# Patient Record
Sex: MALE | Race: WHITE | NOT HISPANIC OR LATINO | Employment: FULL TIME | URBAN - METROPOLITAN AREA
[De-identification: names, ages, dates, MRNs, and addresses within clinical notes are randomized per-mention and may not be internally consistent; named-entity substitution may affect disease eponyms.]

---

## 2019-11-15 ENCOUNTER — TRANSCRIBE ORDERS (OUTPATIENT)
Dept: ADMINISTRATIVE | Facility: HOSPITAL | Age: 39
End: 2019-11-15

## 2019-11-15 DIAGNOSIS — N63.10 UNSPECIFIED LUMP IN THE RIGHT BREAST, UNSPECIFIED QUADRANT: Primary | ICD-10-CM

## 2022-08-09 ENCOUNTER — HOSPITAL ENCOUNTER (EMERGENCY)
Facility: HOSPITAL | Age: 42
Discharge: PSYCHIATRIC OR SUBSTANCE USE FACILITY | End: 2022-08-09
Attending: EMERGENCY MEDICINE | Admitting: EMERGENCY MEDICINE

## 2022-08-09 ENCOUNTER — HOSPITAL ENCOUNTER (INPATIENT)
Facility: HOSPITAL | Age: 42
LOS: 6 days | Discharge: HOME | End: 2022-08-15
Attending: PSYCHIATRY & NEUROLOGY | Admitting: PSYCHIATRY & NEUROLOGY

## 2022-08-09 VITALS
OXYGEN SATURATION: 98 % | BODY MASS INDEX: 22.22 KG/M2 | WEIGHT: 150 LBS | TEMPERATURE: 97.8 F | HEIGHT: 69 IN | SYSTOLIC BLOOD PRESSURE: 132 MMHG | DIASTOLIC BLOOD PRESSURE: 102 MMHG | RESPIRATION RATE: 17 BRPM | HEART RATE: 114 BPM

## 2022-08-09 DIAGNOSIS — F10.10 ETOH ABUSE: Primary | ICD-10-CM

## 2022-08-09 DIAGNOSIS — F10.929 ALCOHOLIC INTOXICATION WITH COMPLICATION: ICD-10-CM

## 2022-08-09 PROBLEM — Z92.89 S/P ALCOHOL DETOXIFICATION: Status: ACTIVE | Noted: 2022-08-09

## 2022-08-09 LAB
ALBUMIN SERPL-MCNC: 4.65 G/DL (ref 3.5–5.2)
ALBUMIN/GLOB SERPL: 1.5 G/DL
ALP SERPL-CCNC: 181 U/L (ref 39–117)
ALT SERPL W P-5'-P-CCNC: 52 U/L (ref 1–41)
AMPHET+METHAMPHET UR QL: NEGATIVE
AMPHETAMINES UR QL: NEGATIVE
ANION GAP SERPL CALCULATED.3IONS-SCNC: 20.9 MMOL/L (ref 5–15)
AST SERPL-CCNC: 46 U/L (ref 1–40)
BACTERIA UR QL AUTO: ABNORMAL /HPF
BARBITURATES UR QL SCN: NEGATIVE
BASOPHILS # BLD AUTO: 0.05 10*3/MM3 (ref 0–0.2)
BASOPHILS NFR BLD AUTO: 0.9 % (ref 0–1.5)
BENZODIAZ UR QL SCN: NEGATIVE
BILIRUB SERPL-MCNC: 0.3 MG/DL (ref 0–1.2)
BILIRUB UR QL STRIP: ABNORMAL
BUN SERPL-MCNC: 7 MG/DL (ref 6–20)
BUN/CREAT SERPL: 7.4 (ref 7–25)
BUPRENORPHINE SERPL-MCNC: NEGATIVE NG/ML
CALCIUM SPEC-SCNC: 9.4 MG/DL (ref 8.6–10.5)
CANNABINOIDS SERPL QL: POSITIVE
CHLORIDE SERPL-SCNC: 96 MMOL/L (ref 98–107)
CLARITY UR: ABNORMAL
CO2 SERPL-SCNC: 22.1 MMOL/L (ref 22–29)
COCAINE UR QL: NEGATIVE
COLOR UR: ABNORMAL
CREAT SERPL-MCNC: 0.95 MG/DL (ref 0.76–1.27)
DEPRECATED RDW RBC AUTO: 44.6 FL (ref 37–54)
EGFRCR SERPLBLD CKD-EPI 2021: 103.8 ML/MIN/1.73
EOSINOPHIL # BLD AUTO: 0.01 10*3/MM3 (ref 0–0.4)
EOSINOPHIL NFR BLD AUTO: 0.2 % (ref 0.3–6.2)
ERYTHROCYTE [DISTWIDTH] IN BLOOD BY AUTOMATED COUNT: 12.9 % (ref 12.3–15.4)
ETHANOL BLD-MCNC: 93 MG/DL (ref 0–10)
ETHANOL UR QL: 0.09 %
FLUAV RNA RESP QL NAA+PROBE: NOT DETECTED
FLUBV RNA RESP QL NAA+PROBE: NOT DETECTED
GLOBULIN UR ELPH-MCNC: 3.2 GM/DL
GLUCOSE SERPL-MCNC: 145 MG/DL (ref 65–99)
GLUCOSE UR STRIP-MCNC: NEGATIVE MG/DL
HCT VFR BLD AUTO: 48.2 % (ref 37.5–51)
HGB BLD-MCNC: 16.9 G/DL (ref 13–17.7)
HGB UR QL STRIP.AUTO: NEGATIVE
HYALINE CASTS UR QL AUTO: ABNORMAL /LPF
IMM GRANULOCYTES # BLD AUTO: 0.01 10*3/MM3 (ref 0–0.05)
IMM GRANULOCYTES NFR BLD AUTO: 0.2 % (ref 0–0.5)
KETONES UR QL STRIP: NEGATIVE
LEUKOCYTE ESTERASE UR QL STRIP.AUTO: NEGATIVE
LYMPHOCYTES # BLD AUTO: 2.42 10*3/MM3 (ref 0.7–3.1)
LYMPHOCYTES NFR BLD AUTO: 45.8 % (ref 19.6–45.3)
MAGNESIUM SERPL-MCNC: 1.7 MG/DL (ref 1.6–2.6)
MCH RBC QN AUTO: 33.2 PG (ref 26.6–33)
MCHC RBC AUTO-ENTMCNC: 35.1 G/DL (ref 31.5–35.7)
MCV RBC AUTO: 94.7 FL (ref 79–97)
METHADONE UR QL SCN: NEGATIVE
MONOCYTES # BLD AUTO: 0.65 10*3/MM3 (ref 0.1–0.9)
MONOCYTES NFR BLD AUTO: 12.3 % (ref 5–12)
NEUTROPHILS NFR BLD AUTO: 2.14 10*3/MM3 (ref 1.7–7)
NEUTROPHILS NFR BLD AUTO: 40.6 % (ref 42.7–76)
NITRITE UR QL STRIP: NEGATIVE
NRBC BLD AUTO-RTO: 0 /100 WBC (ref 0–0.2)
OPIATES UR QL: NEGATIVE
OXYCODONE UR QL SCN: NEGATIVE
PCP UR QL SCN: NEGATIVE
PH UR STRIP.AUTO: 5.5 [PH] (ref 5–8)
PLATELET # BLD AUTO: 298 10*3/MM3 (ref 140–450)
PMV BLD AUTO: 9.2 FL (ref 6–12)
POTASSIUM SERPL-SCNC: 3.1 MMOL/L (ref 3.5–5.2)
PROPOXYPH UR QL: NEGATIVE
PROT SERPL-MCNC: 7.8 G/DL (ref 6–8.5)
PROT UR QL STRIP: ABNORMAL
RBC # BLD AUTO: 5.09 10*6/MM3 (ref 4.14–5.8)
RBC # UR STRIP: ABNORMAL /HPF
REF LAB TEST METHOD: ABNORMAL
SARS-COV-2 RNA RESP QL NAA+PROBE: NOT DETECTED
SODIUM SERPL-SCNC: 139 MMOL/L (ref 136–145)
SP GR UR STRIP: 1.03 (ref 1–1.03)
SQUAMOUS #/AREA URNS HPF: ABNORMAL /HPF
TRICYCLICS UR QL SCN: NEGATIVE
UROBILINOGEN UR QL STRIP: ABNORMAL
WBC # UR STRIP: ABNORMAL /HPF
WBC NRBC COR # BLD: 5.28 10*3/MM3 (ref 3.4–10.8)

## 2022-08-09 PROCEDURE — 85025 COMPLETE CBC W/AUTO DIFF WBC: CPT | Performed by: EMERGENCY MEDICINE

## 2022-08-09 PROCEDURE — 82077 ASSAY SPEC XCP UR&BREATH IA: CPT | Performed by: EMERGENCY MEDICINE

## 2022-08-09 PROCEDURE — 36415 COLL VENOUS BLD VENIPUNCTURE: CPT

## 2022-08-09 PROCEDURE — 83735 ASSAY OF MAGNESIUM: CPT | Performed by: EMERGENCY MEDICINE

## 2022-08-09 PROCEDURE — 93005 ELECTROCARDIOGRAM TRACING: CPT | Performed by: PSYCHIATRY & NEUROLOGY

## 2022-08-09 PROCEDURE — 99285 EMERGENCY DEPT VISIT HI MDM: CPT

## 2022-08-09 PROCEDURE — C9803 HOPD COVID-19 SPEC COLLECT: HCPCS | Performed by: EMERGENCY MEDICINE

## 2022-08-09 PROCEDURE — 81001 URINALYSIS AUTO W/SCOPE: CPT | Performed by: EMERGENCY MEDICINE

## 2022-08-09 PROCEDURE — 80053 COMPREHEN METABOLIC PANEL: CPT | Performed by: EMERGENCY MEDICINE

## 2022-08-09 PROCEDURE — 80306 DRUG TEST PRSMV INSTRMNT: CPT | Performed by: EMERGENCY MEDICINE

## 2022-08-09 PROCEDURE — 87636 SARSCOV2 & INF A&B AMP PRB: CPT | Performed by: EMERGENCY MEDICINE

## 2022-08-09 RX ORDER — LANOLIN ALCOHOL/MO/W.PET/CERES
1000 CREAM (GRAM) TOPICAL DAILY
Status: CANCELLED | OUTPATIENT
Start: 2022-08-10

## 2022-08-09 RX ORDER — ALUMINA, MAGNESIA, AND SIMETHICONE 2400; 2400; 240 MG/30ML; MG/30ML; MG/30ML
15 SUSPENSION ORAL EVERY 6 HOURS PRN
Status: DISCONTINUED | OUTPATIENT
Start: 2022-08-09 | End: 2022-08-15 | Stop reason: HOSPADM

## 2022-08-09 RX ORDER — LOPERAMIDE HYDROCHLORIDE 2 MG/1
2 CAPSULE ORAL
Status: DISCONTINUED | OUTPATIENT
Start: 2022-08-09 | End: 2022-08-15 | Stop reason: HOSPADM

## 2022-08-09 RX ORDER — LANOLIN ALCOHOL/MO/W.PET/CERES
1000 CREAM (GRAM) TOPICAL DAILY
COMMUNITY
End: 2022-08-15 | Stop reason: HOSPADM

## 2022-08-09 RX ORDER — OMEPRAZOLE 20 MG/1
20 CAPSULE, DELAYED RELEASE ORAL DAILY
Status: ON HOLD | COMMUNITY
End: 2022-08-15 | Stop reason: SDUPTHER

## 2022-08-09 RX ORDER — ACETAMINOPHEN 325 MG/1
650 TABLET ORAL EVERY 6 HOURS PRN
Status: DISCONTINUED | OUTPATIENT
Start: 2022-08-09 | End: 2022-08-10

## 2022-08-09 RX ORDER — LORAZEPAM 2 MG/1
2 TABLET ORAL ONCE
Status: COMPLETED | OUTPATIENT
Start: 2022-08-09 | End: 2022-08-09

## 2022-08-09 RX ORDER — ONDANSETRON 4 MG/1
4 TABLET, FILM COATED ORAL EVERY 6 HOURS PRN
Status: DISCONTINUED | OUTPATIENT
Start: 2022-08-09 | End: 2022-08-15 | Stop reason: HOSPADM

## 2022-08-09 RX ORDER — LORAZEPAM 0.5 MG/1
0.5 TABLET ORAL
Status: COMPLETED | OUTPATIENT
Start: 2022-08-13 | End: 2022-08-13

## 2022-08-09 RX ORDER — DIPHENHYDRAMINE HCL 25 MG
25 CAPSULE ORAL NIGHTLY
Status: CANCELLED | OUTPATIENT
Start: 2022-08-09

## 2022-08-09 RX ORDER — CHOLECALCIFEROL (VITAMIN D3) 125 MCG
5 CAPSULE ORAL NIGHTLY
Status: ON HOLD | COMMUNITY
End: 2022-08-15 | Stop reason: SDUPTHER

## 2022-08-09 RX ORDER — LORAZEPAM 1 MG/1
1 TABLET ORAL
Status: COMPLETED | OUTPATIENT
Start: 2022-08-12 | End: 2022-08-12

## 2022-08-09 RX ORDER — POTASSIUM CHLORIDE 1.5 G/1.77G
40 POWDER, FOR SOLUTION ORAL ONCE
Status: COMPLETED | OUTPATIENT
Start: 2022-08-09 | End: 2022-08-09

## 2022-08-09 RX ORDER — MULTIPLE VITAMINS W/ MINERALS TAB 9MG-400MCG
1 TAB ORAL DAILY
Status: DISCONTINUED | OUTPATIENT
Start: 2022-08-10 | End: 2022-08-15 | Stop reason: HOSPADM

## 2022-08-09 RX ORDER — FAMOTIDINE 20 MG/1
20 TABLET, FILM COATED ORAL 2 TIMES DAILY PRN
Status: DISCONTINUED | OUTPATIENT
Start: 2022-08-09 | End: 2022-08-15 | Stop reason: HOSPADM

## 2022-08-09 RX ORDER — IBUPROFEN 400 MG/1
400 TABLET ORAL EVERY 6 HOURS PRN
Status: DISCONTINUED | OUTPATIENT
Start: 2022-08-09 | End: 2022-08-15 | Stop reason: HOSPADM

## 2022-08-09 RX ORDER — BENZTROPINE MESYLATE 1 MG/1
2 TABLET ORAL ONCE AS NEEDED
Status: DISCONTINUED | OUTPATIENT
Start: 2022-08-09 | End: 2022-08-15 | Stop reason: HOSPADM

## 2022-08-09 RX ORDER — DIPHENOXYLATE HYDROCHLORIDE AND ATROPINE SULFATE 2.5; .025 MG/1; MG/1
1 TABLET ORAL DAILY
COMMUNITY
End: 2022-08-15 | Stop reason: HOSPADM

## 2022-08-09 RX ORDER — DIPHENOXYLATE HYDROCHLORIDE AND ATROPINE SULFATE 2.5; .025 MG/1; MG/1
1 TABLET ORAL DAILY
Status: CANCELLED | OUTPATIENT
Start: 2022-08-10

## 2022-08-09 RX ORDER — LORAZEPAM 0.5 MG/1
0.5 TABLET ORAL EVERY 4 HOURS PRN
Status: ACTIVE | OUTPATIENT
Start: 2022-08-13 | End: 2022-08-14

## 2022-08-09 RX ORDER — TRAZODONE HYDROCHLORIDE 50 MG/1
50 TABLET ORAL NIGHTLY PRN
Status: DISCONTINUED | OUTPATIENT
Start: 2022-08-09 | End: 2022-08-12

## 2022-08-09 RX ORDER — LORAZEPAM 1 MG/1
1 TABLET ORAL EVERY 4 HOURS PRN
Status: ACTIVE | OUTPATIENT
Start: 2022-08-12 | End: 2022-08-13

## 2022-08-09 RX ORDER — CETIRIZINE HYDROCHLORIDE 10 MG/1
10 TABLET ORAL DAILY
Status: ON HOLD | COMMUNITY
End: 2022-08-15 | Stop reason: SDUPTHER

## 2022-08-09 RX ORDER — BENZONATATE 100 MG/1
100 CAPSULE ORAL 3 TIMES DAILY PRN
Status: DISCONTINUED | OUTPATIENT
Start: 2022-08-09 | End: 2022-08-15 | Stop reason: HOSPADM

## 2022-08-09 RX ORDER — CLONIDINE HYDROCHLORIDE 0.1 MG/1
0.1 TABLET ORAL ONCE
Status: COMPLETED | OUTPATIENT
Start: 2022-08-09 | End: 2022-08-09

## 2022-08-09 RX ORDER — ECHINACEA PURPUREA EXTRACT 125 MG
2 TABLET ORAL AS NEEDED
Status: DISCONTINUED | OUTPATIENT
Start: 2022-08-09 | End: 2022-08-15 | Stop reason: HOSPADM

## 2022-08-09 RX ORDER — LORAZEPAM 2 MG/1
2 TABLET ORAL EVERY 4 HOURS PRN
Status: DISPENSED | OUTPATIENT
Start: 2022-08-10 | End: 2022-08-11

## 2022-08-09 RX ORDER — CLONIDINE HYDROCHLORIDE 0.1 MG/1
0.1 TABLET ORAL DAILY PRN
Status: DISCONTINUED | OUTPATIENT
Start: 2022-08-09 | End: 2022-08-15 | Stop reason: HOSPADM

## 2022-08-09 RX ORDER — BENZTROPINE MESYLATE 1 MG/ML
1 INJECTION INTRAMUSCULAR; INTRAVENOUS ONCE AS NEEDED
Status: DISCONTINUED | OUTPATIENT
Start: 2022-08-09 | End: 2022-08-15 | Stop reason: HOSPADM

## 2022-08-09 RX ORDER — NICOTINE 21 MG/24HR
1 PATCH, TRANSDERMAL 24 HOURS TRANSDERMAL
Status: DISCONTINUED | OUTPATIENT
Start: 2022-08-10 | End: 2022-08-15 | Stop reason: HOSPADM

## 2022-08-09 RX ORDER — LORAZEPAM 2 MG/1
2 TABLET ORAL
Status: COMPLETED | OUTPATIENT
Start: 2022-08-10 | End: 2022-08-10

## 2022-08-09 RX ORDER — MULTIVITAMIN WITH IRON
2 TABLET ORAL DAILY
Status: DISCONTINUED | OUTPATIENT
Start: 2022-08-10 | End: 2022-08-15 | Stop reason: HOSPADM

## 2022-08-09 RX ORDER — LORAZEPAM 2 MG/1
2 TABLET ORAL
Status: DISPENSED | OUTPATIENT
Start: 2022-08-09 | End: 2022-08-10

## 2022-08-09 RX ORDER — HYDROXYZINE 50 MG/1
50 TABLET, FILM COATED ORAL EVERY 6 HOURS PRN
Status: DISCONTINUED | OUTPATIENT
Start: 2022-08-09 | End: 2022-08-15 | Stop reason: HOSPADM

## 2022-08-09 RX ORDER — DIPHENHYDRAMINE HCL 25 MG
25 CAPSULE ORAL NIGHTLY
COMMUNITY
End: 2022-08-15 | Stop reason: HOSPADM

## 2022-08-09 RX ADMIN — LORAZEPAM 2 MG: 2 TABLET ORAL at 17:02

## 2022-08-09 RX ADMIN — TRAZODONE HYDROCHLORIDE 50 MG: 50 TABLET ORAL at 22:11

## 2022-08-09 RX ADMIN — CLONIDINE HYDROCHLORIDE 0.1 MG: 0.1 TABLET ORAL at 18:07

## 2022-08-09 RX ADMIN — LORAZEPAM 2 MG: 2 TABLET ORAL at 23:20

## 2022-08-09 RX ADMIN — POTASSIUM CHLORIDE 40 MEQ: 1.5 POWDER, FOR SOLUTION ORAL at 20:03

## 2022-08-09 RX ADMIN — LORAZEPAM 2 MG: 2 TABLET ORAL at 21:15

## 2022-08-09 RX ADMIN — FAMOTIDINE 20 MG: 20 TABLET, FILM COATED ORAL at 21:24

## 2022-08-09 RX ADMIN — CLONIDINE HYDROCHLORIDE 0.1 MG: 0.1 TABLET ORAL at 17:02

## 2022-08-09 RX ADMIN — HYDROXYZINE HYDROCHLORIDE 50 MG: 50 TABLET ORAL at 22:10

## 2022-08-09 RX ADMIN — LORAZEPAM 2 MG: 2 TABLET ORAL at 18:07

## 2022-08-09 NOTE — NURSING NOTE
Pt arrived in intake, searched with two staff members present, pt's belongings placed in safe storage, safety precautions in place, pt advised to wear a mask and remain 6 ft from others.     Pt presents with a CIWA of 12 at this time and is hypertensive, MD made aware, new orders noted.

## 2022-08-09 NOTE — NURSING NOTE
Spoke to Dr. Hayden, discussed assessment and labs, new orders to admit to detox with routine orders SP4, Ativan detox protocol, Clonidine 0.1 mg PRN for BP greater than 180/110, repeat CMP in the am. [id number]

## 2022-08-09 NOTE — ED PROVIDER NOTES
Subjective   This is a 42-year-old male who presents to the emergency department chief complaint alcohol abuse.  Patient states last drink was around 4 AM.  Patient states he would like to go through alcohol detox.  Patient denies any other associated complaints at this time.      History provided by:  Patient   used: No    Drug / Alcohol Assessment  Primary symptoms include no confusion, no somnolence, no seizures, no agitation. This is a new problem. The current episode started less than 1 hour ago. The problem has not changed since onset.Suspected agents include alcohol. Pertinent negatives include no fever, no injury, no nausea and no bladder incontinence. Associated medical issues include addiction treatment. Associated medical issues do not include chronic illness, psychiatric history or recent illness.       Review of Systems   Constitutional: Negative.  Negative for fever.   Eyes: Negative.  Negative for pain, redness and itching.   Respiratory: Negative.  Negative for choking and shortness of breath.    Cardiovascular: Negative.  Negative for chest pain, palpitations and leg swelling.   Gastrointestinal: Negative.  Negative for nausea.   Endocrine: Negative.  Negative for heat intolerance, polydipsia and polyphagia.   Genitourinary: Negative.  Negative for bladder incontinence, difficulty urinating, enuresis, flank pain, frequency and genital sores.   Musculoskeletal: Negative.  Negative for gait problem, joint swelling and myalgias.   Skin: Negative for color change, pallor and rash.   Neurological: Negative.  Negative for seizures, light-headedness, numbness and headaches.   Hematological: Negative.  Negative for adenopathy. Does not bruise/bleed easily.   Psychiatric/Behavioral: Negative for agitation and confusion.   All other systems reviewed and are negative.      Past Medical History:   Diagnosis Date   • Alcohol abuse    • Hodgkin's lymphoma (HCC) 2002   • Migraines    • PTSD  (post-traumatic stress disorder)    • Seizures (HCC)        Allergies   Allergen Reactions   • Naproxen Hives   • Penicillins Hives       No past surgical history on file.    No family history on file.    Social History     Socioeconomic History   • Marital status:    Tobacco Use   • Smoking status: Current Every Day Smoker     Packs/day: 0.25     Types: Cigarettes   • Smokeless tobacco: Never Used   Vaping Use   • Vaping Use: Never used   Substance and Sexual Activity   • Alcohol use: Yes   • Drug use: Yes     Types: Other     Comment: alcohol   • Sexual activity: Not Currently     Partners: Female           Objective   Physical Exam  Vitals and nursing note reviewed.   Constitutional:       General: He is not in acute distress.     Appearance: Normal appearance. He is normal weight. He is not ill-appearing or toxic-appearing.   HENT:      Head: Normocephalic and atraumatic.      Right Ear: Tympanic membrane, ear canal and external ear normal. There is no impacted cerumen.      Left Ear: Tympanic membrane, ear canal and external ear normal. There is no impacted cerumen.      Nose: Nose normal. No congestion or rhinorrhea.      Mouth/Throat:      Mouth: Mucous membranes are moist.      Pharynx: Oropharynx is clear. No oropharyngeal exudate or posterior oropharyngeal erythema.   Eyes:      General: No scleral icterus.        Right eye: No discharge.         Left eye: No discharge.      Extraocular Movements: Extraocular movements intact.      Conjunctiva/sclera: Conjunctivae normal.      Pupils: Pupils are equal, round, and reactive to light.   Cardiovascular:      Rate and Rhythm: Normal rate and regular rhythm.      Pulses: Normal pulses.      Heart sounds: Normal heart sounds. No murmur heard.    No friction rub. No gallop.   Pulmonary:      Effort: Pulmonary effort is normal. No respiratory distress.      Breath sounds: Normal breath sounds. No stridor. No wheezing, rhonchi or rales.   Chest:      Chest  wall: No tenderness.   Abdominal:      General: Abdomen is flat. Bowel sounds are normal. There is no distension.      Palpations: Abdomen is soft. There is no mass.      Tenderness: There is no abdominal tenderness. There is no right CVA tenderness, left CVA tenderness, guarding or rebound.      Hernia: No hernia is present.   Musculoskeletal:         General: No swelling, tenderness, deformity or signs of injury. Normal range of motion.      Cervical back: No rigidity or tenderness.      Right lower leg: No edema.      Left lower leg: No edema.   Lymphadenopathy:      Cervical: No cervical adenopathy.   Skin:     General: Skin is warm and dry.      Coloration: Skin is not jaundiced or pale.      Findings: No bruising, erythema, lesion or rash.   Neurological:      General: No focal deficit present.      Mental Status: He is alert and oriented to person, place, and time. Mental status is at baseline.      Cranial Nerves: No cranial nerve deficit.      Sensory: No sensory deficit.      Motor: No weakness.      Coordination: Coordination normal.      Gait: Gait normal.      Deep Tendon Reflexes: Reflexes normal.   Psychiatric:         Mood and Affect: Mood normal.         Behavior: Behavior normal.         Thought Content: Thought content normal.         Procedures           ED Course                                           MDM    Final diagnoses:   ETOH abuse   Alcoholic intoxication with complication (HCC)       ED Disposition  ED Disposition     ED Disposition   Discharge    Condition   Stable    Comment   --             Tanna Garza, APRN  [address] [facility] [address] [phone]    Call in 1 day           Medication List      No changes were made to your prescriptions during this visit.          Rodrigo Hodges PA-C  08/09/22 1902

## 2022-08-09 NOTE — NURSING NOTE
Pt states he is here for alcohol detox, states he drinks 5-6 beers or 1 pint of [place] daily with his last drink being 0400 this am.  States he has been drinking this amount for the last 20 years.      Pt denies any other substance abuse other than occasional marijuana use.    Pt denies any SI/HI/AVH    Pt rates his current craving 9/10 at this time    Pt rates his depression 9/10 and anxiety 8/10 at this time     Pt states his wife passed away suddenly this past June 2022 and it has caused him a lot of depression.    CIWA was a 12 upon arrival to unit, CIWA is now a 7 after two doses of Ativan 2 mg PO

## 2022-08-10 PROBLEM — F12.10 TETRAHYDROCANNABINOL (THC) USE DISORDER, MILD, ABUSE: Status: ACTIVE | Noted: 2022-08-10

## 2022-08-10 PROBLEM — F10.20 ALCOHOL USE DISORDER, SEVERE, DEPENDENCE: Status: ACTIVE | Noted: 2022-08-09

## 2022-08-10 LAB
ALBUMIN SERPL-MCNC: 3.65 G/DL (ref 3.5–5.2)
ALBUMIN/GLOB SERPL: 1.6 G/DL
ALP SERPL-CCNC: 156 U/L (ref 39–117)
ALT SERPL W P-5'-P-CCNC: 41 U/L (ref 1–41)
ANION GAP SERPL CALCULATED.3IONS-SCNC: 11.4 MMOL/L (ref 5–15)
AST SERPL-CCNC: 52 U/L (ref 1–40)
BILIRUB SERPL-MCNC: 0.9 MG/DL (ref 0–1.2)
BUN SERPL-MCNC: 5 MG/DL (ref 6–20)
BUN/CREAT SERPL: 6.8 (ref 7–25)
CALCIUM SPEC-SCNC: 8.8 MG/DL (ref 8.6–10.5)
CHLORIDE SERPL-SCNC: 99 MMOL/L (ref 98–107)
CO2 SERPL-SCNC: 25.6 MMOL/L (ref 22–29)
CREAT SERPL-MCNC: 0.73 MG/DL (ref 0.76–1.27)
EGFRCR SERPLBLD CKD-EPI 2021: 118 ML/MIN/1.73
GLOBULIN UR ELPH-MCNC: 2.3 GM/DL
GLUCOSE SERPL-MCNC: 112 MG/DL (ref 65–99)
POTASSIUM SERPL-SCNC: 3.4 MMOL/L (ref 3.5–5.2)
PROT SERPL-MCNC: 5.9 G/DL (ref 6–8.5)
SODIUM SERPL-SCNC: 136 MMOL/L (ref 136–145)

## 2022-08-10 PROCEDURE — 93010 ELECTROCARDIOGRAM REPORT: CPT | Performed by: INTERNAL MEDICINE

## 2022-08-10 PROCEDURE — 80053 COMPREHEN METABOLIC PANEL: CPT | Performed by: PSYCHIATRY & NEUROLOGY

## 2022-08-10 PROCEDURE — 99223 1ST HOSP IP/OBS HIGH 75: CPT | Performed by: PSYCHIATRY & NEUROLOGY

## 2022-08-10 RX ORDER — CHOLECALCIFEROL (VITAMIN D3) 125 MCG
5 CAPSULE ORAL NIGHTLY
Status: DISCONTINUED | OUTPATIENT
Start: 2022-08-10 | End: 2022-08-15 | Stop reason: HOSPADM

## 2022-08-10 RX ORDER — PANTOPRAZOLE SODIUM 40 MG/1
40 TABLET, DELAYED RELEASE ORAL EVERY MORNING
Status: DISCONTINUED | OUTPATIENT
Start: 2022-08-10 | End: 2022-08-15 | Stop reason: HOSPADM

## 2022-08-10 RX ORDER — LORAZEPAM 2 MG/1
2 TABLET ORAL
Status: DISPENSED | OUTPATIENT
Start: 2022-08-10 | End: 2022-08-11

## 2022-08-10 RX ORDER — CHLORDIAZEPOXIDE HYDROCHLORIDE 25 MG/1
50 CAPSULE, GELATIN COATED ORAL ONCE
Status: COMPLETED | OUTPATIENT
Start: 2022-08-10 | End: 2022-08-10

## 2022-08-10 RX ORDER — CETIRIZINE HYDROCHLORIDE 10 MG/1
10 TABLET ORAL DAILY
Status: DISCONTINUED | OUTPATIENT
Start: 2022-08-10 | End: 2022-08-15 | Stop reason: HOSPADM

## 2022-08-10 RX ADMIN — CETIRIZINE HYDROCHLORIDE 10 MG: 10 TABLET, FILM COATED ORAL at 09:20

## 2022-08-10 RX ADMIN — CHLORDIAZEPOXIDE HYDROCHLORIDE 50 MG: 25 CAPSULE ORAL at 21:23

## 2022-08-10 RX ADMIN — LORAZEPAM 2 MG: 2 TABLET ORAL at 14:14

## 2022-08-10 RX ADMIN — LORAZEPAM 2 MG: 2 TABLET ORAL at 23:28

## 2022-08-10 RX ADMIN — Medication 1 TABLET: at 08:28

## 2022-08-10 RX ADMIN — HYDROXYZINE HYDROCHLORIDE 50 MG: 50 TABLET ORAL at 12:03

## 2022-08-10 RX ADMIN — LORAZEPAM 2 MG: 2 TABLET ORAL at 17:16

## 2022-08-10 RX ADMIN — LORAZEPAM 2 MG: 2 TABLET ORAL at 21:23

## 2022-08-10 RX ADMIN — LORAZEPAM 2 MG: 2 TABLET ORAL at 12:00

## 2022-08-10 RX ADMIN — HYDROXYZINE HYDROCHLORIDE 50 MG: 50 TABLET ORAL at 19:29

## 2022-08-10 RX ADMIN — PANTOPRAZOLE SODIUM 40 MG: 40 TABLET, DELAYED RELEASE ORAL at 09:20

## 2022-08-10 RX ADMIN — Medication 2 TABLET: at 08:28

## 2022-08-10 RX ADMIN — CLONIDINE HYDROCHLORIDE 0.1 MG: 0.1 TABLET ORAL at 13:22

## 2022-08-10 RX ADMIN — Medication 100 MG: at 08:28

## 2022-08-10 RX ADMIN — LORAZEPAM 2 MG: 2 TABLET ORAL at 08:28

## 2022-08-10 RX ADMIN — LORAZEPAM 2 MG: 2 TABLET ORAL at 01:25

## 2022-08-10 RX ADMIN — LORAZEPAM 2 MG: 2 TABLET ORAL at 19:29

## 2022-08-10 RX ADMIN — CHLORDIAZEPOXIDE HYDROCHLORIDE 50 MG: 25 CAPSULE ORAL at 15:48

## 2022-08-10 RX ADMIN — Medication 5 MG: at 21:23
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
## 2022-08-10 NOTE — PLAN OF CARE
Problem: Adult Behavioral Health Plan of Care  Goal: Plan of Care Review  Outcome: Ongoing, Progressing  Flowsheets (Taken 8/10/2022 0953)  Consent Given to Review Plan with:   Mother   Magnolia  Progress: no change  Plan of Care Reviewed With: patient  Patient Agreement with Plan of Care: agrees  Outcome Evaluation: New admit. Completed social history and integrated summary  Goal: Patient-Specific Goal (Individualization)  Outcome: Ongoing, Progressing  Flowsheets  Taken 8/10/2022 0953  Patient-Specific Goals (Include Timeframe): Patient will deny SI/HI prior to discharge. Patient will identify 1 healthy coping skill to prevent relapse prior to discharge. Patient will consent to appropriate aftercare plan prior to discharge.  Individualized Care Needs: Therapist will offer 1-4 individual sessions, family education, aftercare planning  Anxieties, Fears or Concerns: None  Taken 8/10/2022 0944  Patient Personal Strengths:   expressive of emotions   expressive of needs   family/social support   intellectual cognitive skills   parenting skills   positive educational history   realistic evaluation of current/future capabilities   resilient   resourceful   motivated for recovery   motivated for treatment   spiritual/Cheondoism support   stable living environment  Patient Vulnerabilities:   substance abuse/addiction   traumatic event  Goal: Optimized Coping Skills in Response to Life Stressors  Outcome: Ongoing, Progressing  Intervention: Promote Effective Coping Strategies  Flowsheets (Taken 8/10/2022 0953)  Supportive Measures:   active listening utilized   counseling provided  Goal: Develops/Participates in Therapeutic Varnell to Support Successful Transition  Outcome: Ongoing, Progressing  Intervention: Foster Therapeutic Varnell  Flowsheets (Taken 8/10/2022 0953)  Trust Relationship/Rapport:   care explained   questions answered   choices provided   questions encouraged   reassurance provided   emotional support  provided   empathic listening provided   thoughts/feelings acknowledged  Intervention: Mutually Develop Transition Plan  Flowsheets  Taken 8/10/2022 0953  Outpatient/Agency/Support Group Needs: residential services  Transition Support:   community resources reviewed   crisis management plan verbalized   follow-up care coordinated   crisis management plan promoted   follow-up care discussed  Anticipated Discharge Disposition: residential substance use unit  Taken 8/10/2022 0950  Discharge Coordination/Progress: Patient has medicaid pending. He has signed for [employer] this date.  Concerns Comments: NA  Transportation Anticipated: family or friend will provide  Transportation Concerns: no car  Current Discharge Risk:   substance use/abuse   psychiatric illness  Concerns to be Addressed:   coping/stress   cognitive/perceptual   grief and loss   substance/tobacco abuse/use   medication   mental health   discharge planning  Readmission Within the Last 30 Days: no previous admission in last 30 days  Patient/Family Anticipated Services at Transition: rehabilitation services  Patient's Choice of Community Agency(s): [facility]  Patient/Family Anticipates Transition to: inpatient rehabilitation facility  Offered/Gave Vendor List: no   Goal Outcome Evaluation:  Plan of Care Reviewed With: patient  Patient Agreement with Plan of Care: agrees  Consent Given to Review Plan with: MotherGerson Merida  Progress: no change  Outcome Evaluation: New admit. Completed social history and integrated summary

## 2022-08-10 NOTE — PROGRESS NOTES
Navigator is helping Primary Therapist with discharge planning for patient. Navigator completed the following referrals on this day:    [facility]  - [phone]  -Sent 8/10

## 2022-08-10 NOTE — NURSING NOTE
"Pt states he wants to \"Quit drinking so I don't make my daughter an orphan and to live long enough to see her grow up.\" Pt drinking 5-6 beers or a 1/5th of bourbon daily for approximately the last 20 years. Pt lists stressors including his wife Jaguar passing away 6/12/2022 at home after aspirating blood before EMS could arrive. Pt planning to go to the [facility] upon discharge.  "

## 2022-08-10 NOTE — NURSING NOTE
Pt slept well throughout the night, waking only briefly to take PRN meds and/or VS. Per Intake note placed order for repeat CMP.
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
## 2022-08-10 NOTE — PLAN OF CARE
Goal Outcome Evaluation:  Plan of Care Reviewed With: patient  Patient Agreement with Plan of Care: agrees         PT RATES DEPRESSION 5/10. REPORTS CRAVING 6/10. WITHDRAWALS INCLUDE: FATIGUE & TREMORS. HTN NOTED THE MAJORITY OF THE SHIFT, SEE NEW ORDERS. PT ISOLATES TO HIS ROOM THE MAJORITY OF THE SHIFT.

## 2022-08-11 LAB
QT INTERVAL: 420 MS
QTC INTERVAL: 475 MS

## 2022-08-11 PROCEDURE — 99232 SBSQ HOSP IP/OBS MODERATE 35: CPT | Performed by: PSYCHIATRY & NEUROLOGY

## 2022-08-11 RX ORDER — PROPRANOLOL HYDROCHLORIDE 10 MG/1
10 TABLET ORAL EVERY 12 HOURS SCHEDULED
Status: DISCONTINUED | OUTPATIENT
Start: 2022-08-11 | End: 2022-08-12

## 2022-08-11 RX ORDER — CHLORDIAZEPOXIDE HYDROCHLORIDE 25 MG/1
50 CAPSULE, GELATIN COATED ORAL ONCE
Status: COMPLETED | OUTPATIENT
Start: 2022-08-11 | End: 2022-08-11

## 2022-08-11 RX ORDER — PROPRANOLOL HYDROCHLORIDE 10 MG/1
10 TABLET ORAL EVERY 12 HOURS SCHEDULED
Status: DISCONTINUED | OUTPATIENT
Start: 2022-08-11 | End: 2022-08-11

## 2022-08-11 RX ADMIN — HYDROXYZINE HYDROCHLORIDE 50 MG: 50 TABLET ORAL at 22:43

## 2022-08-11 RX ADMIN — LORAZEPAM 1.5 MG: 1 TABLET ORAL at 21:03

## 2022-08-11 RX ADMIN — CLONIDINE HYDROCHLORIDE 0.1 MG: 0.1 TABLET ORAL at 21:04

## 2022-08-11 RX ADMIN — LORAZEPAM 2 MG: 2 TABLET ORAL at 13:46

## 2022-08-11 RX ADMIN — Medication 2 TABLET: at 08:49

## 2022-08-11 RX ADMIN — Medication 1 TABLET: at 08:49

## 2022-08-11 RX ADMIN — CHLORDIAZEPOXIDE HYDROCHLORIDE 50 MG: 25 CAPSULE ORAL at 12:32

## 2022-08-11 RX ADMIN — LORAZEPAM 1.5 MG: 1 TABLET ORAL at 14:58

## 2022-08-11 RX ADMIN — PROPRANOLOL HYDROCHLORIDE 10 MG: 10 TABLET ORAL at 14:57

## 2022-08-11 RX ADMIN — LORAZEPAM 2 MG: 2 TABLET ORAL at 01:53

## 2022-08-11 RX ADMIN — Medication 5 MG: at 21:03

## 2022-08-11 RX ADMIN — PANTOPRAZOLE SODIUM 40 MG: 40 TABLET, DELAYED RELEASE ORAL at 07:35

## 2022-08-11 RX ADMIN — LORAZEPAM 2 MG: 2 TABLET ORAL at 07:18

## 2022-08-11 RX ADMIN — CETIRIZINE HYDROCHLORIDE 10 MG: 10 TABLET, FILM COATED ORAL at 08:49

## 2022-08-11 RX ADMIN — HYDROXYZINE HYDROCHLORIDE 50 MG: 50 TABLET ORAL at 01:53

## 2022-08-11 RX ADMIN — LORAZEPAM 2 MG: 2 TABLET ORAL at 10:42

## 2022-08-11 RX ADMIN — Medication 100 MG: at 08:50

## 2022-08-11 RX ADMIN — LORAZEPAM 1.5 MG: 1 TABLET ORAL at 08:56

## 2022-08-11 NOTE — NURSING NOTE
Contacted DR. MARIANNA Spence regarding Pts progression. Pt is actively having visual hallucinations, increased blood pressure, unsteady gate, and confusion.     New order given Librium 50mg PO once Stat  [id number]

## 2022-08-11 NOTE — PLAN OF CARE
Goal Outcome Evaluation:  Plan of Care Reviewed With: patient  Patient Agreement with Plan of Care: agrees     Progress: improving     Pt continues to have elevated blood pressure. Given PRN Librium and Ativan. Slept well and appetite  is poor for shift.

## 2022-08-11 NOTE — NURSING NOTE
Informed Dr. Varags via telephone conversation to inform him of patient's b/p 133/99 and pulse 133.

## 2022-08-11 NOTE — PLAN OF CARE
Goal Outcome Evaluation:  Plan of Care Reviewed With: patient  Patient Agreement with Plan of Care: agrees     Progress: improving  Outcome Evaluation: Patient has had elevated pulse and b/p most of the day. Rates anxiety 8/10. Depression 10/10. Wd's tremors. No other issues noted at this time. Will continue to monitor.

## 2022-08-11 NOTE — PROGRESS NOTES
The patient received naloxone education as part of group.  The patient received verbal and written education on the following:   - Risk factors of opioid overdose  - Strategies to prevent opioid overdose  - Signs of opioid overdose  - Steps in responding to an overdose   - Information about naloxone  - Procedures for administering naloxone  - Proper storage and expiration of the naloxone product dispensed      Patient is not interested in receiving at this time, but will let treatment team know if that changes at which time it can be dispensed per protocol via pharmacy.       The signed protocol is kept in the [facility] [address]     The patient was given the opportunity to ask questions.  All questions were addressed.  The patient expressed understanding of the education provided.      Loan Lamb, Pharmacy Intern  08/11/22  11:09 EDT

## 2022-08-11 NOTE — PLAN OF CARE
Problem: Adult Behavioral Health Plan of Care  Goal: Patient-Specific Goal (Individualization)  Outcome: Ongoing, Progressing  Flowsheets  Taken 8/10/2022 0953 by Doretha Bassett  Patient-Specific Goals (Include Timeframe): Patient will deny SI/HI prior to discharge. Patient will identify 1 healthy coping skill to prevent relapse prior to discharge. Patient will consent to appropriate aftercare plan prior to discharge.  Individualized Care Needs: Therapist will offer 1-4 individual sessions, family education, aftercare planning  Anxieties, Fears or Concerns: None  Taken 8/10/2022 0944 by Doretha Bassett  Patient Personal Strengths:   expressive of emotions   expressive of needs   family/social support   intellectual cognitive skills   parenting skills   positive educational history   realistic evaluation of current/future capabilities   resilient   resourceful   motivated for recovery   motivated for treatment   spiritual/Baptism support   stable living environment  Patient Vulnerabilities:   substance abuse/addiction   traumatic event     Problem: Adult Behavioral Health Plan of Care  Goal: Optimized Coping Skills in Response to Life Stressors  Outcome: Ongoing, Progressing  Flowsheets (Taken 8/11/2022 0945)  Optimized Coping Skills in Response to Life Stressors: making progress toward outcome     Problem: Adult Behavioral Health Plan of Care  Goal: Optimized Coping Skills in Response to Life Stressors  Intervention: Promote Effective Coping Strategies  Flowsheets (Taken 8/11/2022 0945)  Supportive Measures:   active listening utilized   positive reinforcement provided   self-responsibility promoted   counseling provided   problem-solving facilitated   verbalization of feelings encouraged   decision-making supported   goal-setting facilitated   self-care encouraged   self-reflection promoted     Problem: Adult Behavioral Health Plan of Care  Goal: Develops/Participates in Therapeutic  [place] to Support Successful Transition  Outcome: Ongoing, Progressing  Flowsheets (Taken 8/11/2022 0945)  Develops/Participates in Therapeutic [place] to Support Successful Transition: making progress toward outcome     Problem: Adult Behavioral Health Plan of Care  Goal: Develops/Participates in Therapeutic [place] to Support Successful Transition  Intervention: Foster Therapeutic Lowman  Flowsheets (Taken 8/11/2022 0945)  Trust Relationship/Rapport:   care explained   reassurance provided   choices provided   thoughts/feelings acknowledged   emotional support provided   empathic listening provided   questions answered   questions encouraged     Problem: Adult Behavioral Health Plan of Care  Goal: Develops/Participates in Therapeutic [place] to Support Successful Transition  Intervention: Mutually Develop Transition Plan  Flowsheets  Taken 8/11/2022 0945  Transition Support:   community resources reviewed   crisis management plan promoted   follow-up care discussed  Taken 8/11/2022 0944  Discharge Coordination/Progress: Patient has medicaid pending, consent obtained for [facility]  Transportation Anticipated: family or friend will provide  Transportation Concerns: no car  Current Discharge Risk:   substance use/abuse   psychiatric illness  Concerns to be Addressed:   coping/stress   cognitive/perceptual   grief and loss   substance/tobacco abuse/use   medication   mental health   discharge planning  Readmission Within the Last 30 Days: no previous admission in last 30 days  Patient/Family Anticipated Services at Transition: rehabilitation services  Patient's Choice of Community Agency(s): [facility]  Patient/Family Anticipates Transition to: inpatient rehabilitation facility  Offered/Gave Vendor List: no  Data:  Therapist discussed patient with nursing staff and met with patient this date to further discuss patient progress, review healthy coping and safe disposition.    Therapist spoke with  patients mother.  She inquired about the detox protocol and discussed that patient has a history of DT's and ICU with detox. She discussed that the plan is for patient to attend [facility] and she reported that she has been refraining from [place] but she may have to consider if patient does not follow through with attending [facility] as she is concerned about the severity of patients issues and compounded issue of his wife passing.  She stated that she had wanted to come to visitation but patient had requested that she not because he is not feeling up to visitors.  She requested this therapist tell patient she loves him.    Clinical Maneuvering/Intervention:    Therapist assisted patient in processing above session content; acknowledged and normalized patient's thoughts, feelings and concerns.  Encouraged patient to discuss/vent feelings, frustrations, and fears concerning their ongoing issues and validated patients feelings.  Discussed the importance of healthy coping and reviewed healthy coping skills such as distraction, thought reframing/redirecting, grounding, mindfulness, etc.  Reviewed safe disposition with patient.    Assessment:  Patient denies suicidal ideation/homicidal ideation.  Patient reports ongoing depression and anxiety today.  Patient states he did experience hallucinations and thought his wife was in his room last night and then ran out.  He reported also that sometimes the walls will wave and patterns on the floor will move.  He was agreeable for this therapist to contact his mother to discuss his status and agreed he will also contact her at phone time.    Plan:  Patient will continue hospitalization/medication management. Patient is planned to attend [facility] following his stabilization-consent obtained.

## 2022-08-11 NOTE — PROGRESS NOTES
"INPATIENT PSYCHIATRIC PROGRESS NOTE    Name:  Juan Gomes  :  1980  MRN:  [id number]  Visit Number:  [id number]  Length of stay:  2    SUBJECTIVE    CC/Focus of Exam: alcohol use    INTERVAL HISTORY:  The patient states he is feeling better in terms of withdrawals, the shakiness is going down, but he sometimes sees the walls moving and he saw his wife last night. His pulse and blood pressure have been elevated.   Depression rating 9/10  Anxiety rating 10/10  Sleep: not good  Withdrawal sx: tremors  Cravin/10    Review of Systems   Constitutional: Negative.    Respiratory: Negative.    Cardiovascular: Negative.    Gastrointestinal: Negative.    Psychiatric/Behavioral: Positive for dysphoric mood. The patient is nervous/anxious.        OBJECTIVE    Temp:  [97.3 °F (36.3 °C)-98.2 °F (36.8 °C)] 98.1 °F (36.7 °C)  Heart Rate:  [] 133  Resp:  [16-18] 18  BP: (122-153)/() 137/99    MENTAL STATUS EXAM:  Appearance:Casually dressed, good hygeine.   Cooperation:Cooperative  Psychomotor: No psychomotor agitation/retardation, No EPS, No motor tics  Speech-normal rate, amount.  Mood \"anxious\"   Affect-congruent, appropriate, stable  Thought Content-goal directed, no delusional material present  Thought process-linear, organized.  Suicidality: No SI  Homicidality: No HI  Perception: No AH/VH  Insight-fair   Judgement-fair    Lab Results (last 24 hours)     ** No results found for the last 24 hours. **             Imaging Results (Last 24 Hours)     ** No results found for the last 24 hours. **             ECG/EMG Results (most recent)     Procedure Component Value Units Date/Time    ECG 12 Lead [[id number]] Collected: 08/10/22 0059     Updated: 08/10/22 0104     QT Interval 420 ms      QTC Interval 475 ms     Narrative:      Test Reason : Baseline Cardiac Status  Blood Pressure :   */*   mmHG  Vent. Rate :  77 BPM     Atrial Rate :  77 BPM     P-R Int : 166 ms          QRS Dur :  86 ms      QT Int : 420 " ms       P-R-T Axes :  36   7  28 degrees     QTc Int : 475 ms    Normal sinus rhythm  Inferior infarct , age undetermined  Abnormal ECG  No previous ECGs available    Referred By:            Confirmed By:            ALLERGIES: Naproxen and Penicillins      Current Facility-Administered Medications:   •  aluminum-magnesium hydroxide-simethicone (MAALOX MAX) 400-400-40 MG/5ML suspension 15 mL, 15 mL, Oral, Q6H PRN, Zach Hayden MD  •  B-complex with vitamin C tablet 2 tablet, 2 tablet, Oral, Daily, Zach Hayden MD, 2 tablet at 22 0849  •  benzonatate (TESSALON) capsule 100 mg, 100 mg, Oral, TID PRN, Zach Hayden MD  •  benztropine (COGENTIN) tablet 2 mg, 2 mg, Oral, Once PRN **OR** benztropine (COGENTIN) injection 1 mg, 1 mg, Intramuscular, Once PRN, Zach Hayden MD  •  cetirizine (zyrTEC) tablet 10 mg, 10 mg, Oral, Daily, Sharon Vargas MD, 10 mg at 22 0849  •  cloNIDine (CATAPRES) tablet 0.1 mg, 0.1 mg, Oral, Daily PRN, Zach Hayden MD, 0.1 mg at 08/10/22 1322  •  famotidine (PEPCID) tablet 20 mg, 20 mg, Oral, BID PRN, Zach Hayden MD, 20 mg at 224  •  hydrOXYzine (ATARAX) tablet 50 mg, 50 mg, Oral, Q6H PRN, Zach Hayden MD, 50 mg at 22 0153  •  ibuprofen (ADVIL,MOTRIN) tablet 400 mg, 400 mg, Oral, Q6H PRN, Zach Hayden MD  •  loperamide (IMODIUM) capsule 2 mg, 2 mg, Oral, Q2H PRN, Zach Hayden MD  •  [COMPLETED] LORazepam (ATIVAN) tablet 2 mg, 2 mg, Oral, 3 times per day, 2 mg at 08/10/22 2123 **FOLLOWED BY** LORazepam (ATIVAN) tablet 1.5 mg, 1.5 mg, Oral, 3 times per day, 1.5 mg at 22 0856 **FOLLOWED BY** [START ON 2022] LORazepam (ATIVAN) tablet 1 mg, 1 mg, Oral, 3 times per day **FOLLOWED BY** [START ON 2022] LORazepam (ATIVAN) tablet 0.5 mg, 0.5 mg, Oral, 3 times per day, Zach Hayden MD  •  [] LORazepam (ATIVAN) tablet 2 mg, 2 mg, Oral, Q4H PRN, 2 mg at 22 0718 **FOLLOWED BY** LORazepam (ATIVAN) tablet 1.5  mg, 1.5 mg, Oral, Q4H PRN **FOLLOWED BY** [START ON 8/12/2022] LORazepam (ATIVAN) tablet 1 mg, 1 mg, Oral, Q4H PRN **FOLLOWED BY** [START ON 8/13/2022] LORazepam (ATIVAN) tablet 0.5 mg, 0.5 mg, Oral, Q4H PRN, Zach Hayden MD  •  LORazepam (ATIVAN) tablet 2 mg, 2 mg, Oral, Q2H PRN, Sharon Vargas MD, 2 mg at 08/11/22 1346  •  magnesium hydroxide (MILK OF MAGNESIA) suspension 10 mL, 10 mL, Oral, Daily PRN, Zach Hayden MD  •  melatonin tablet 5 mg, 5 mg, Oral, Nightly, Sharon Vargas MD, 5 mg at 08/10/22 2123  •  multivitamin with minerals 1 tablet, 1 tablet, Oral, Daily, Zach Hayden MD, 1 tablet at 08/11/22 0849  •  nicotine (NICODERM CQ) 21 MG/24HR patch 1 patch, 1 patch, Transdermal, Q24H, Zach Hayden MD  •  ondansetron (ZOFRAN) tablet 4 mg, 4 mg, Oral, Q6H PRN, Zach Hayden MD  •  pantoprazole (PROTONIX) EC tablet 40 mg, 40 mg, Oral, QAM, Sharon Vargas MD, 40 mg at 08/11/22 0735  •  sodium chloride nasal spray 2 spray, 2 spray, Each Nare, PRN, Zach Hayden MD  •  thiamine (VITAMIN B-1) tablet 100 mg, 100 mg, Oral, Daily, Zach Hayden MD, 100 mg at 08/11/22 0850  •  traZODone (DESYREL) tablet 50 mg, 50 mg, Oral, Nightly PRN, Zach Hayden MD, 50 mg at 08/09/22 2211    ASSESSMENT & PLAN:      Alcohol use disorder, severe, dependence (HCC)  - Continue Ativan detox  - Thiamine and folate       Tetrahydrocannabinol (THC) use disorder, mild, abuse  - Supportive treatment        Depressive disorder unspecified  - Remeron 15 mg hs       Nicotine use disorder  - Encourage cessation      Tachycardia  - Start propranolol 10 mg bid and it may also help with anxiety.    Special precautions: Special Precautions Level 4 (q30 min checks).    Behavioral Health Treatment Plan and Problem List: I have reviewed and approved the Behavioral Health Treatment Plan and Problem list.  The patient has had a chance to review and agrees with the treatment plan.     Clinician:  Sharon Vargas,  MD  08/11/22  14:39 EDT

## 2022-08-12 PROCEDURE — 99232 SBSQ HOSP IP/OBS MODERATE 35: CPT | Performed by: PSYCHIATRY & NEUROLOGY

## 2022-08-12 RX ORDER — MIRTAZAPINE 15 MG/1
15 TABLET, FILM COATED ORAL NIGHTLY
Status: DISCONTINUED | OUTPATIENT
Start: 2022-08-12 | End: 2022-08-15 | Stop reason: HOSPADM

## 2022-08-12 RX ADMIN — PROPRANOLOL HYDROCHLORIDE 10 MG: 10 TABLET ORAL at 08:11

## 2022-08-12 RX ADMIN — CETIRIZINE HYDROCHLORIDE 10 MG: 10 TABLET, FILM COATED ORAL at 08:11

## 2022-08-12 RX ADMIN — MIRTAZAPINE 15 MG: 15 TABLET, FILM COATED ORAL at 21:00

## 2022-08-12 RX ADMIN — LORAZEPAM 1 MG: 1 TABLET ORAL at 08:12

## 2022-08-12 RX ADMIN — METOPROLOL TARTRATE 25 MG: 25 TABLET, FILM COATED ORAL at 12:53

## 2022-08-12 RX ADMIN — Medication 100 MG: at 08:11

## 2022-08-12 RX ADMIN — LORAZEPAM 1 MG: 1 TABLET ORAL at 21:00

## 2022-08-12 RX ADMIN — PANTOPRAZOLE SODIUM 40 MG: 40 TABLET, DELAYED RELEASE ORAL at 08:11

## 2022-08-12 RX ADMIN — CLONIDINE HYDROCHLORIDE 0.1 MG: 0.1 TABLET ORAL at 09:48

## 2022-08-12 RX ADMIN — Medication 5 MG: at 21:00

## 2022-08-12 RX ADMIN — METOPROLOL TARTRATE 25 MG: 25 TABLET, FILM COATED ORAL at 21:02

## 2022-08-12 RX ADMIN — LORAZEPAM 1 MG: 1 TABLET ORAL at 14:49

## 2022-08-12 RX ADMIN — Medication 1 TABLET: at 08:11

## 2022-08-12 RX ADMIN — HYDROXYZINE HYDROCHLORIDE 50 MG: 50 TABLET ORAL at 14:49

## 2022-08-12 RX ADMIN — Medication 2 TABLET: at 08:11

## 2022-08-12 NOTE — PLAN OF CARE
Goal Outcome Evaluation:  Plan of Care Reviewed With: patient  Patient Agreement with Plan of Care: agrees         Pt calm and cooperative with staff and other pts throughout shift. Pt rates cravings 5, anxiety 8, depression 10. Pt is fixated on discharge, and has frequently reached out to staff during shift.

## 2022-08-12 NOTE — PLAN OF CARE
Problem: Adult Behavioral Health Plan of Care  Goal: Plan of Care Review  Outcome: Ongoing, Progressing  Flowsheets  Taken 8/12/2022 1704  Plan of Care Reviewed With: patient  Patient Agreement with Plan of Care: agrees  Outcome Evaluation: client continued to have elevated blood pressure and pulse today. dr. dunlap did medication change to lisinopril. client calm and cooperative. preoccupied with discharge today.  Taken 8/12/2022 0724  Plan of Care Reviewed With: patient  Patient Agreement with Plan of Care: agrees   Goal Outcome Evaluation:  Plan of Care Reviewed With: patient  Patient Agreement with Plan of Care: agrees        Outcome Evaluation: client continued to have elevated blood pressure and pulse today. dr. dunlap did medication change to lisinopril. client calm and cooperative. preoccupied with discharge today.

## 2022-08-12 NOTE — PROGRESS NOTES
"INPATIENT PSYCHIATRIC PROGRESS NOTE    Name:  Juan Gomes  :  1980  MRN:  [id number]  Visit Number:  [id number]  Length of stay:  3    SUBJECTIVE    CC/Focus of Exam: alcohol use    INTERVAL HISTORY:  The patient states he is feeling better and is not feeling as shaky as before and his appetite is also better. His blood pressure is still elevated and he agreed to switch propranolol to metoprolol.   Depression rating 6/10  Anxiety rating 6/10  Sleep: better  Withdrawal sx: tremors  Cravin/10    Review of Systems   Constitutional: Negative.    Respiratory: Negative.    Cardiovascular: Negative.    Gastrointestinal: Negative.    Psychiatric/Behavioral: Positive for dysphoric mood. The patient is nervous/anxious.        OBJECTIVE    Temp:  [97.5 °F (36.4 °C)-98.2 °F (36.8 °C)] 98.1 °F (36.7 °C)  Heart Rate:  [] 96  Resp:  [16-18] 16  BP: (133-150)/() 145/100    MENTAL STATUS EXAM:  Appearance:Casually dressed, good hygeine.   Cooperation:Cooperative  Psychomotor: No psychomotor agitation/retardation, No EPS, No motor tics  Speech-normal rate, amount.  Mood \"anxious\"   Affect-congruent, appropriate, stable  Thought Content-goal directed, no delusional material present  Thought process-linear, organized.  Suicidality: No SI  Homicidality: No HI  Perception: No AH/VH  Insight-fair   Judgement-fair    Lab Results (last 24 hours)     ** No results found for the last 24 hours. **             Imaging Results (Last 24 Hours)     ** No results found for the last 24 hours. **             ECG/EMG Results (most recent)     Procedure Component Value Units Date/Time    ECG 12 Lead [[id number]] Collected: 08/10/22 0059     Updated: 22     QT Interval 420 ms      QTC Interval 475 ms     Narrative:      Test Reason : Baseline Cardiac Status  Blood Pressure :   */*   mmHG  Vent. Rate :  77 BPM     Atrial Rate :  77 BPM     P-R Int : 166 ms          QRS Dur :  86 ms      QT Int : 420 ms       P-R-T Axes " :  36   7  28 degrees     QTc Int : 475 ms    Normal sinus rhythm  Inferior infarct , age undetermined  Abnormal ECG  No previous ECGs available  Confirmed by Ramana Newby () on 2022 6:51:32 PM    Referred By:            Confirmed By: Ramana Newby           ALLERGIES: Naproxen and Penicillins      Current Facility-Administered Medications:   •  aluminum-magnesium hydroxide-simethicone (MAALOX MAX) 400-400-40 MG/5ML suspension 15 mL, 15 mL, Oral, Q6H PRN, Zcah Hayden MD  •  B-complex with vitamin C tablet 2 tablet, 2 tablet, Oral, Daily, Zach Hayden MD, 2 tablet at 22 0811  •  benzonatate (TESSALON) capsule 100 mg, 100 mg, Oral, TID PRN, Zach Hayden MD  •  benztropine (COGENTIN) tablet 2 mg, 2 mg, Oral, Once PRN **OR** benztropine (COGENTIN) injection 1 mg, 1 mg, Intramuscular, Once PRN, Zach Hayden MD  •  cetirizine (zyrTEC) tablet 10 mg, 10 mg, Oral, Daily, Sharon Vargas MD, 10 mg at 22  •  cloNIDine (CATAPRES) tablet 0.1 mg, 0.1 mg, Oral, Daily PRN, Zach Hayden MD, 0.1 mg at 22 0948  •  famotidine (PEPCID) tablet 20 mg, 20 mg, Oral, BID PRN, Zach Hayden MD, 20 mg at 22  •  hydrOXYzine (ATARAX) tablet 50 mg, 50 mg, Oral, Q6H PRN, Zach Hayden MD, 50 mg at 22 2243  •  ibuprofen (ADVIL,MOTRIN) tablet 400 mg, 400 mg, Oral, Q6H PRN, Zach Hayden MD  •  loperamide (IMODIUM) capsule 2 mg, 2 mg, Oral, Q2H PRN, Zach Hayden MD  •  [COMPLETED] LORazepam (ATIVAN) tablet 2 mg, 2 mg, Oral, 3 times per day, 2 mg at 08/10/22 2123 **FOLLOWED BY** [COMPLETED] LORazepam (ATIVAN) tablet 1.5 mg, 1.5 mg, Oral, 3 times per day, 1.5 mg at 22 **FOLLOWED BY** LORazepam (ATIVAN) tablet 1 mg, 1 mg, Oral, 3 times per day, 1 mg at 22 **FOLLOWED BY** [START ON 2022] LORazepam (ATIVAN) tablet 0.5 mg, 0.5 mg, Oral, 3 times per day, Zach Hayden MD  •  [] LORazepam (ATIVAN) tablet 2 mg, 2 mg, Oral, Q4H  PRN, 2 mg at 22 **FOLLOWED BY** [] LORazepam (ATIVAN) tablet 1.5 mg, 1.5 mg, Oral, Q4H PRN **FOLLOWED BY** LORazepam (ATIVAN) tablet 1 mg, 1 mg, Oral, Q4H PRN **FOLLOWED BY** [START ON 2022] LORazepam (ATIVAN) tablet 0.5 mg, 0.5 mg, Oral, Q4H PRN, Zach Hayden MD  •  magnesium hydroxide (MILK OF MAGNESIA) suspension 10 mL, 10 mL, Oral, Daily PRN, Zach Hayden MD  •  melatonin tablet 5 mg, 5 mg, Oral, Nightly, Sharon Vargas MD, 5 mg at 22  •  metoprolol tartrate (LOPRESSOR) tablet 25 mg, 25 mg, Oral, Q12H, Sharon Vargas MD  •  mirtazapine (REMERON) tablet 15 mg, 15 mg, Oral, Nightly, Sharon Vargas MD  •  multivitamin with minerals 1 tablet, 1 tablet, Oral, Daily, Zach Hayden MD, 1 tablet at 22  •  nicotine (NICODERM CQ) 21 MG/24HR patch 1 patch, 1 patch, Transdermal, Q24H, Zach Hayden MD  •  ondansetron (ZOFRAN) tablet 4 mg, 4 mg, Oral, Q6H PRN, Zach Hayden MD  •  pantoprazole (PROTONIX) EC tablet 40 mg, 40 mg, Oral, QAM, Sharon Vargas MD, 40 mg at 22  •  sodium chloride nasal spray 2 spray, 2 spray, Each Nare, PRN, Zach Hayden MD  •  thiamine (VITAMIN B-1) tablet 100 mg, 100 mg, Oral, Daily, Zach Hayden MD, 100 mg at 22    ASSESSMENT & PLAN:      Alcohol use disorder, severe, dependence (HCC)  - Continue Ativan detox  - Thiamine and folate       Tetrahydrocannabinol (THC) use disorder, mild, abuse  - Supportive treatment        Depressive disorder unspecified  - Remeron 15 mg hs       Nicotine use disorder  - Encourage cessation      Hypertension and tachycardia  - Start metoprolol 25 mg bid  - Stop propranolol.    Special precautions: Special Precautions Level 4 (q30 min checks).    Behavioral Health Treatment Plan and Problem List: I have reviewed and approved the Behavioral Health Treatment Plan and Problem list.  The patient has had a chance to review and agrees with the treatment plan.     Clinician:  Sharon  MD Sam  08/12/22  12:20 EDT

## 2022-08-12 NOTE — DISCHARGE INSTR - APPOINTMENTS
[facility] [id number] [place] [address]   [phone]    A referral was submitted. Please contact facility to obtain bed date.

## 2022-08-12 NOTE — PROGRESS NOTES
Navigator is helping Primary Therapist with discharge planning for patient. Navigator completed the following referrals on this day:     [facility]  - [phone]  -Sent 8/10  -Faxed today's MD note for review. Spoke with Blair. He will check into patients insurance coverage. Treatment team to send updated MD notes on Monday.  8/12

## 2022-08-13 PROCEDURE — 99232 SBSQ HOSP IP/OBS MODERATE 35: CPT | Performed by: PSYCHIATRY & NEUROLOGY

## 2022-08-13 RX ADMIN — HYDROXYZINE HYDROCHLORIDE 50 MG: 50 TABLET ORAL at 22:42

## 2022-08-13 RX ADMIN — METOPROLOL TARTRATE 25 MG: 25 TABLET, FILM COATED ORAL at 08:28

## 2022-08-13 RX ADMIN — LORAZEPAM 0.5 MG: 0.5 TABLET ORAL at 14:24

## 2022-08-13 RX ADMIN — PANTOPRAZOLE SODIUM 40 MG: 40 TABLET, DELAYED RELEASE ORAL at 08:28

## 2022-08-13 RX ADMIN — Medication 100 MG: at 08:28

## 2022-08-13 RX ADMIN — METOPROLOL TARTRATE 25 MG: 25 TABLET, FILM COATED ORAL at 21:07

## 2022-08-13 RX ADMIN — LORAZEPAM 0.5 MG: 0.5 TABLET ORAL at 21:07

## 2022-08-13 RX ADMIN — Medication 1 TABLET: at 08:28

## 2022-08-13 RX ADMIN — CETIRIZINE HYDROCHLORIDE 10 MG: 10 TABLET, FILM COATED ORAL at 08:28

## 2022-08-13 RX ADMIN — MIRTAZAPINE 15 MG: 15 TABLET, FILM COATED ORAL at 21:07

## 2022-08-13 RX ADMIN — LORAZEPAM 0.5 MG: 0.5 TABLET ORAL at 08:28

## 2022-08-13 RX ADMIN — Medication 5 MG: at 21:07

## 2022-08-13 RX ADMIN — Medication 2 TABLET: at 08:28

## 2022-08-13 NOTE — PROGRESS NOTES
"    Detox Recovery Unit Progress Note   Clinician: Lobo Spence MD  Admission Date: 8/9/2022  09:18 EDT 08/13/22    Behavioral Health Treatment Plan and Problem List: I have reviewed and approved the Behavioral Health Treatment Plan and Problem list.    Allergies  Allergies   Allergen Reactions   • Naproxen Hives   • Penicillins Hives       Hospital Day: 4 days      Assessment completed within view of staff    History  CC: withdrawal symptoms    Interval HPI: Patient seen and evaluated by me.  Chart reviewed. Patient is withdrawing from alcohol.    Current withdrawal symptoms include: Anxiety, fatigue    ROS otherwise as below.        Interval Review of Systems:   General ROS: negative for - fever.  Positive for withdrawal symptoms mentioned above.  Endocrine ROS: negative for - palpitations  Respiratory ROS: no cough, shortness of breath, or wheezing  Cardiovascular ROS: no chest pain or dyspnea on exertion  Gastrointestinal ROS: no abdominal pain,no black or bloody stools    /90 (BP Location: Right arm, Patient Position: Sitting)   Pulse 99   Temp 96.7 °F (35.9 °C) (Temporal)   Resp 18   Ht 175.3 cm (69\")   Wt 67.6 kg (149 lb)   SpO2 97%   BMI 22.00 kg/m²     Mental Status Exam  Mood: anxiety  Affect: mood congruent  Thought Processes: linear  Oriented X3:  yes  Thought Content: sensorium intact   Suicidal Thoughts: denies  Homicidal Thoughts: denies        Medical Decision Making:   Labs:     Lab Results (last 24 hours)     ** No results found for the last 24 hours. **            Radiology:     Imaging Results (Last 24 Hours)     ** No results found for the last 24 hours. **            EKG:     ECG/EMG Results (most recent)     Procedure Component Value Units Date/Time    ECG 12 Lead [[id number]] Collected: 08/10/22 0059     Updated: 08/11/22 1852     QT Interval 420 ms      QTC Interval 475 ms     Narrative:      Test Reason : Baseline Cardiac Status  Blood Pressure :   */*   mmHG  Vent. Rate :  77 " BPM     Atrial Rate :  77 BPM     P-R Int : 166 ms          QRS Dur :  86 ms      QT Int : 420 ms       P-R-T Axes :  36   7  28 degrees     QTc Int : 475 ms    Normal sinus rhythm  Inferior infarct , age undetermined  Abnormal ECG  No previous ECGs available  Confirmed by Ramana Newby ([id number]) on 8/11/2022 6:51:32 PM    Referred By:            Confirmed By: Ramana Newby           Medications:  B-complex with vitamin C, 2 tablet, Oral, Daily  cetirizine, 10 mg, Oral, Daily  LORazepam, 0.5 mg, Oral, 3 times per day  melatonin, 5 mg, Oral, Nightly  metoprolol tartrate, 25 mg, Oral, Q12H  mirtazapine, 15 mg, Oral, Nightly  multivitamin with minerals, 1 tablet, Oral, Daily  nicotine, 1 patch, Transdermal, Q24H  pantoprazole, 40 mg, Oral, QAM  thiamine, 100 mg, Oral, Daily           All medications reviewed.      Assessment and Plan:   Alcohol use disorder, severe, dependence (HCC)  - Continue Ativan detox  - Thiamine and folate       Tetrahydrocannabinol (THC) use disorder, mild, abuse  - Supportive treatment        Depressive disorder unspecified  - Remeron 15 mg hs       Nicotine use disorder  - Encourage cessation       Hypertension and tachycardia  - Continue metoprolol 25 mg bid  - Stop propranolol.      Continue hospitalization for medical management of drug withdrawal and patient safety and stabilization.  Continue individual and group chemical dependency counseling.   Therapist and treatment team will continue to assist patient in establishing plans for follow-up and rehabilitation that will serve as the next step in care once patient has completed the medical detox.

## 2022-08-13 NOTE — PLAN OF CARE
Goal Outcome Evaluation:  Plan of Care Reviewed With: patient  Patient Agreement with Plan of Care: agrees     Progress: improving  Outcome Evaluation: Pt states craving is “next to nothing” and notes his tremor is “almost gone.” Pt denies w/d sx and rates anxiety 6/10 and depression 9/10 but says that his depression is “better.” Pt states he’s having difficulty “staying asleep” but no trouble going to sleep. Discussed relaxation options, discouraged napping and reviewed sleep meds. Pt still having minor AVH saying “occasionally the walls are waving” but that he knows that “it’s not real.” Pt anxious about discharge plans but hopeful to continue his rehab.   Pt slept throughout the night.

## 2022-08-14 PROCEDURE — 99232 SBSQ HOSP IP/OBS MODERATE 35: CPT | Performed by: PSYCHIATRY & NEUROLOGY

## 2022-08-14 RX ADMIN — CLONIDINE HYDROCHLORIDE 0.1 MG: 0.1 TABLET ORAL at 21:16

## 2022-08-14 RX ADMIN — Medication 5 MG: at 21:16

## 2022-08-14 RX ADMIN — PANTOPRAZOLE SODIUM 40 MG: 40 TABLET, DELAYED RELEASE ORAL at 08:20

## 2022-08-14 RX ADMIN — HYDROXYZINE HYDROCHLORIDE 50 MG: 50 TABLET ORAL at 21:16

## 2022-08-14 RX ADMIN — Medication 2 TABLET: at 08:20

## 2022-08-14 RX ADMIN — Medication 100 MG: at 08:20

## 2022-08-14 RX ADMIN — METOPROLOL TARTRATE 25 MG: 25 TABLET, FILM COATED ORAL at 08:20

## 2022-08-14 RX ADMIN — METOPROLOL TARTRATE 25 MG: 25 TABLET, FILM COATED ORAL at 21:16

## 2022-08-14 RX ADMIN — CETIRIZINE HYDROCHLORIDE 10 MG: 10 TABLET, FILM COATED ORAL at 08:20

## 2022-08-14 RX ADMIN — MIRTAZAPINE 15 MG: 15 TABLET, FILM COATED ORAL at 21:16

## 2022-08-14 RX ADMIN — Medication 1 TABLET: at 08:20

## 2022-08-14 NOTE — PROGRESS NOTES
"    Detox Recovery Unit Progress Note   Clinician: Lobo Spence MD  Admission Date: 8/9/2022  11:21 EDT 08/14/22    Behavioral Health Treatment Plan and Problem List: I have reviewed and approved the Behavioral Health Treatment Plan and Problem list.    Allergies  Allergies   Allergen Reactions   • Naproxen Hives   • Penicillins Hives       Hospital Day: 5 days      Assessment completed within view of staff    History  CC: withdrawal symptoms    Interval HPI: Patient seen and evaluated by me.  Chart reviewed. Patient is withdrawing from alcohol  Current withdrawal symptoms include: fatigue  ROS otherwise as below.        Interval Review of Systems:   General ROS: negative for - fever.  Positive for withdrawal symptoms mentioned above.  Endocrine ROS: negative for - palpitations  Respiratory ROS: no cough, shortness of breath, or wheezing  Cardiovascular ROS: no chest pain or dyspnea on exertion  Gastrointestinal ROS: no abdominal pain,no black or bloody stools    /94 (BP Location: Right arm, Patient Position: Sitting)   Pulse 98   Temp 97 °F (36.1 °C) (Temporal)   Resp 18   Ht 175.3 cm (69\")   Wt 67.6 kg (149 lb)   SpO2 97%   BMI 22.00 kg/m²     Mental Status Exam  Mood: normal  Affect: mood congruent  Thought Processes: linear  Oriented X3:  yes  Thought Content: sensorium intact   Suicidal Thoughts: denies  Homicidal Thoughts: denies        Medical Decision Making:   Labs:     Lab Results (last 24 hours)     ** No results found for the last 24 hours. **            Radiology:     Imaging Results (Last 24 Hours)     ** No results found for the last 24 hours. **            EKG:     ECG/EMG Results (most recent)     Procedure Component Value Units Date/Time    ECG 12 Lead [[id number]] Collected: 08/10/22 0059     Updated: 08/11/22 1852     QT Interval 420 ms      QTC Interval 475 ms     Narrative:      Test Reason : Baseline Cardiac Status  Blood Pressure :   */*   mmHG  Vent. Rate :  77 BPM     Atrial " Rate :  77 BPM     P-R Int : 166 ms          QRS Dur :  86 ms      QT Int : 420 ms       P-R-T Axes :  36   7  28 degrees     QTc Int : 475 ms    Normal sinus rhythm  Inferior infarct , age undetermined  Abnormal ECG  No previous ECGs available  Confirmed by Ramana Newby (2020) on 8/11/2022 6:51:32 PM    Referred By:            Confirmed By: Ramana Newby           Medications:  B-complex with vitamin C, 2 tablet, Oral, Daily  cetirizine, 10 mg, Oral, Daily  melatonin, 5 mg, Oral, Nightly  metoprolol tartrate, 25 mg, Oral, Q12H  mirtazapine, 15 mg, Oral, Nightly  multivitamin with minerals, 1 tablet, Oral, Daily  nicotine, 1 patch, Transdermal, Q24H  pantoprazole, 40 mg, Oral, QAM  thiamine, 100 mg, Oral, Daily           All medications reviewed.      Assessment and Plan:  Alcohol use disorder, severe, dependence (HCC)  - Continue Ativan detox  - Thiamine and folate       Tetrahydrocannabinol (THC) use disorder, mild, abuse  - Supportive treatment        Depressive disorder unspecified  - Remeron 15 mg hs       Nicotine use disorder  - Encourage cessation       Hypertension and tachycardia  - Continue metoprolol 25 mg bid        Continue hospitalization for medical management of drug withdrawal and patient safety and stabilization.  Continue individual and group chemical dependency counseling.   Therapist and treatment team will continue to assist patient in establishing plans for follow-up and rehabilitation that will serve as the next step in care once patient has completed the medical detox.

## 2022-08-14 NOTE — PLAN OF CARE
Problem: Adult Behavioral Health Plan of Care  Goal: Plan of Care Review  Outcome: Ongoing, Progressing  Flowsheets  Taken 8/14/2022 1650  Plan of Care Reviewed With: patient  Patient Agreement with Plan of Care: agrees  Taken 8/14/2022 0812  Plan of Care Reviewed With: patient  Patient Agreement with Plan of Care: agrees   Goal Outcome Evaluation:  Plan of Care Reviewed With: patient  Patient Agreement with Plan of Care: agrees        Outcome Evaluation: client continued to have elevated blood pressure and pulse today. dr. dunalp did medication change to lisinopril. client calm and cooperative. preoccupied with discharge today.

## 2022-08-14 NOTE — PLAN OF CARE
Goal Outcome Evaluation:  Plan of Care Reviewed With: patient  Patient Agreement with Plan of Care: agrees     Progress: no change

## 2022-08-14 NOTE — PLAN OF CARE
Goal Outcome Evaluation:  Plan of Care Reviewed With: patient  Patient Agreement with Plan of Care: agrees     Progress: improving  Outcome Evaluation: Pt rates anxiety 6/10, depression 8/10 and denies craving. Pt also denies w/d sx, AVH, SI/HI.  Pt hoping to d/c to home tomorrow before he goes to rehab. Pt slept throughout the night.

## 2022-08-15 VITALS
HEIGHT: 69 IN | SYSTOLIC BLOOD PRESSURE: 120 MMHG | HEART RATE: 90 BPM | OXYGEN SATURATION: 96 % | WEIGHT: 149 LBS | RESPIRATION RATE: 20 BRPM | TEMPERATURE: 98.1 F | DIASTOLIC BLOOD PRESSURE: 89 MMHG | BODY MASS INDEX: 22.07 KG/M2

## 2022-08-15 PROCEDURE — 99239 HOSP IP/OBS DSCHRG MGMT >30: CPT | Performed by: PSYCHIATRY & NEUROLOGY

## 2022-08-15 RX ORDER — OMEPRAZOLE 20 MG/1
20 CAPSULE, DELAYED RELEASE ORAL DAILY
Qty: 30 CAPSULE | Refills: 0 | Status: SHIPPED | OUTPATIENT
Start: 2022-08-15

## 2022-08-15 RX ORDER — CHOLECALCIFEROL (VITAMIN D3) 125 MCG
5 CAPSULE ORAL NIGHTLY
Qty: 30 EACH | Refills: 0 | Status: SHIPPED | OUTPATIENT
Start: 2022-08-15

## 2022-08-15 RX ORDER — MIRTAZAPINE 15 MG/1
15 TABLET, FILM COATED ORAL NIGHTLY
Qty: 30 TABLET | Refills: 0 | Status: SHIPPED | OUTPATIENT
Start: 2022-08-15

## 2022-08-15 RX ORDER — CETIRIZINE HYDROCHLORIDE 10 MG/1
10 TABLET ORAL DAILY
Qty: 30 TABLET | Refills: 0 | Status: SHIPPED | OUTPATIENT
Start: 2022-08-15

## 2022-08-15 RX ADMIN — Medication 1 TABLET: at 08:05

## 2022-08-15 RX ADMIN — CETIRIZINE HYDROCHLORIDE 10 MG: 10 TABLET, FILM COATED ORAL at 08:05

## 2022-08-15 RX ADMIN — METOPROLOL TARTRATE 25 MG: 25 TABLET, FILM COATED ORAL at 08:06

## 2022-08-15 RX ADMIN — PANTOPRAZOLE SODIUM 40 MG: 40 TABLET, DELAYED RELEASE ORAL at 08:06

## 2022-08-15 RX ADMIN — Medication 2 TABLET: at 08:06

## 2022-08-15 RX ADMIN — Medication 100 MG: at 08:06

## 2022-08-15 NOTE — PLAN OF CARE
Goal Outcome Evaluation:  Plan of Care Reviewed With: patient           Outcome Evaluation: Pt reports that he is hoping to leave today and go to [facility]

## 2022-08-15 NOTE — PLAN OF CARE
Problem: Adult Behavioral Health Plan of Care  Goal: Plan of Care Review  Outcome: Adequate for Care Transition  Flowsheets (Taken 8/15/2022 0812)  Plan of Care Reviewed With: patient  Patient Agreement with Plan of Care: agrees  Goal: Patient-Specific Goal (Individualization)  Outcome: Adequate for Care Transition  Goal: Adheres to Safety Considerations for Self and Others  Outcome: Adequate for Care Transition  Goal: Absence of New-Onset Illness or Injury  Outcome: Adequate for Care Transition  Goal: Optimized Coping Skills in Response to Life Stressors  Outcome: Adequate for Care Transition  Goal: Develops/Participates in Therapeutic Veteran to Support Successful Transition  Outcome: Adequate for Care Transition   Goal Outcome Evaluation:  Plan of Care Reviewed With: patient  Patient Agreement with Plan of Care: agrees        Outcome Evaluation: client continued to have elevated blood pressure and pulse today. dr. dunlap did medication change to lisinopril. client calm and cooperative. preoccupied with discharge today.

## 2022-08-15 NOTE — NURSING NOTE
Pt's B/P 151/114 Hr 109, Pt given scheduled Metoprolol 25mg and PRN Clonidine 0.1, Dr Plasencia notified of elevated vitals and what was given, no new orders given, but asked to recheck vitals in an hour and if not trending down to call him back

## 2022-08-15 NOTE — DISCHARGE SUMMARY
"      PSYCHIATRIC DISCHARGE SUMMARY     Patient Identification:  Name:  Juan Gomes  Age:  42 y.o.  Sex:  male  :  1980  MRN:  [id number]  Visit Number:  [id number]    Date of Admission:2022   Date of Discharge:  8/15/2022    Discharge Diagnosis:  Active Problems:    Alcohol use disorder, severe, dependence (HCC)    Tetrahydrocannabinol (THC) use disorder, mild, abuse    Depressive disorder unspecified    Nicotine use disorder      Admission Diagnosis:  S/P alcohol detoxification [Z92.89]     Hospital Course  Patient is a 42 y.o. male presented with alcohol dependence.  Admitted for medically assisted detox.  History of PTSD and depression.  Patient lost his wife recently.  No acutely concerning labs on admission.  UDS positive for THC.  Patient placed on Ativan detox protocol.  Patient started on metoprolol and mirtazapine, both of which were well tolerated.  Patient completed detox protocol successfully with no acutely concerning symptoms.  Patient reported improvement of symptoms, specifically insomnia.  Patient plans to attend as a house and will be speaking with them for intake assessment this morning.  He plans to return home with family briefly before admission to [facility].  Outpatient care ascertained.    On the day of discharge, patient denied SI, HI or AVH. Patient was stable and appropriate by the conclusion of this admission, denying significant symptoms of mood, psychotic or thought disorder. Patient showed improvement of presenting symptoms and was deemed appropriate for discharge today.    Mental Status Exam upon discharge:   Mood \"better\"   Affect-congruent, appropriate, stable  Thought Content-goal directed, no delusional material present  Thought process-linear, organized.  Suicidality: No SI  Homicidality: No HI  Perception: No /    Procedures Performed         Consults:   Consults     No orders found from 2022 to 8/10/2022.          Pertinent Test Results: "   Lab Results (last 7 days)     Procedure Component Value Units Date/Time    Comprehensive Metabolic Panel [[id number]]  (Abnormal) Collected: 08/10/22 0547    Specimen: Blood Updated: 08/10/22 0629     Glucose 112 mg/dL      BUN 5 mg/dL      Creatinine 0.73 mg/dL      Sodium 136 mmol/L      Potassium 3.4 mmol/L      Comment: Slight hemolysis detected by analyzer. Results may be affected.        Chloride 99 mmol/L      CO2 25.6 mmol/L      Calcium 8.8 mg/dL      Total Protein 5.9 g/dL      Albumin 3.65 g/dL      ALT (SGPT) 41 U/L      AST (SGOT) 52 U/L      Alkaline Phosphatase 156 U/L      Total Bilirubin 0.9 mg/dL      Globulin 2.3 gm/dL      A/G Ratio 1.6 g/dL      BUN/Creatinine Ratio 6.8     Anion Gap 11.4 mmol/L      eGFR 118.0 mL/min/1.73      Comment: National Kidney Foundation and American Society of Nephrology (ASN) Task Force recommended calculation based on the Chronic Kidney Disease Epidemiology Collaboration (CKD-EPI) equation refit without adjustment for race.       Narrative:      GFR Normal >60  Chronic Kidney Disease <60  Kidney Failure <15            Condition on Discharge:  improved    Vital Signs  Temp:  [97 °F (36.1 °C)-98.1 °F (36.7 °C)] 98.1 °F (36.7 °C)  Heart Rate:  [] 90  Resp:  [16-20] 20  BP: (120-151)/() 120/89    Discharge Disposition:  Home or Self Care    Discharge Medications:     Discharge Medications      New Medications      Instructions Start Date   metoprolol tartrate 25 MG tablet  Commonly known as: LOPRESSOR   25 mg, Oral, Every 12 Hours Scheduled      mirtazapine 15 MG tablet  Commonly known as: REMERON   15 mg, Oral, Nightly         Continue These Medications      Instructions Start Date   cetirizine 10 MG tablet  Commonly known as: zyrTEC   10 mg, Oral, Daily      melatonin 5 MG tablet tablet   5 mg, Oral, Nightly      omeprazole 20 MG capsule  Commonly known as: priLOSEC   20 mg, Oral, Daily         Stop These Medications    diphenhydrAMINE 25 mg  capsule  Commonly known as: BENADRYL     multivitamin tablet tablet     vitamin B-12 1000 MCG tablet  Commonly known as: CYANOCOBALAMIN            Discharge Diet: Normal    Activity at Discharge: Normal    Follow-up Appointments  No future appointments.      Test Results Pending at Discharge  None     Time: I spent greater than 30 minutes on this discharge activity which included: face-to-face encounter with the patient, reviewing the data in the system, coordination of the care with the nursing staff as well as consultants, documentation, and entering orders.      Clinician:   Zach Hayden MD  08/15/22  08:01 EDT

## 2023-06-12 ENCOUNTER — HOSPITAL ENCOUNTER (EMERGENCY)
Facility: HOSPITAL | Age: 43
Discharge: ANOTHER HOSPITAL | End: 2023-06-13
Attending: EMERGENCY MEDICINE | Admitting: EMERGENCY MEDICINE
Payer: MEDICAID

## 2023-06-12 DIAGNOSIS — F32.9 MAJOR DEPRESSIVE DISORDER WITH CURRENT ACTIVE EPISODE, UNSPECIFIED DEPRESSION EPISODE SEVERITY, UNSPECIFIED WHETHER RECURRENT: Primary | ICD-10-CM

## 2023-06-12 DIAGNOSIS — T50.902A INTENTIONAL OVERDOSE, INITIAL ENCOUNTER: ICD-10-CM

## 2023-06-12 LAB
ALBUMIN SERPL-MCNC: 4.2 G/DL (ref 3.5–5.2)
ALBUMIN/GLOB SERPL: 1.7 G/DL
ALP SERPL-CCNC: 104 U/L (ref 39–117)
ALT SERPL W P-5'-P-CCNC: 19 U/L (ref 1–41)
AMPHET+METHAMPHET UR QL: NEGATIVE
AMPHETAMINES UR QL: NEGATIVE
ANION GAP SERPL CALCULATED.3IONS-SCNC: 11.2 MMOL/L (ref 5–15)
APAP SERPL-MCNC: <5 MCG/ML (ref 0–30)
AST SERPL-CCNC: 13 U/L (ref 1–40)
BARBITURATES UR QL SCN: NEGATIVE
BASOPHILS # BLD AUTO: 0.04 10*3/MM3 (ref 0–0.2)
BASOPHILS NFR BLD AUTO: 0.6 % (ref 0–1.5)
BENZODIAZ UR QL SCN: NEGATIVE
BILIRUB SERPL-MCNC: <0.2 MG/DL (ref 0–1.2)
BUN SERPL-MCNC: 11 MG/DL (ref 6–20)
BUN/CREAT SERPL: 9.4 (ref 7–25)
BUPRENORPHINE SERPL-MCNC: NEGATIVE NG/ML
CALCIUM SPEC-SCNC: 9.4 MG/DL (ref 8.6–10.5)
CANNABINOIDS SERPL QL: NEGATIVE
CHLORIDE SERPL-SCNC: 106 MMOL/L (ref 98–107)
CO2 SERPL-SCNC: 28.8 MMOL/L (ref 22–29)
COCAINE UR QL: NEGATIVE
CREAT SERPL-MCNC: 1.17 MG/DL (ref 0.76–1.27)
DEPRECATED RDW RBC AUTO: 39.2 FL (ref 37–54)
EGFRCR SERPLBLD CKD-EPI 2021: 80.8 ML/MIN/1.73
EOSINOPHIL # BLD AUTO: 0.1 10*3/MM3 (ref 0–0.4)
EOSINOPHIL NFR BLD AUTO: 1.4 % (ref 0.3–6.2)
ERYTHROCYTE [DISTWIDTH] IN BLOOD BY AUTOMATED COUNT: 11.9 % (ref 12.3–15.4)
ETHANOL BLD-MCNC: <10 MG/DL (ref 0–10)
ETHANOL UR QL: <0.01 %
GLOBULIN UR ELPH-MCNC: 2.5 GM/DL
GLUCOSE SERPL-MCNC: 101 MG/DL (ref 65–99)
HCT VFR BLD AUTO: 45 % (ref 37.5–51)
HGB BLD-MCNC: 15.4 G/DL (ref 13–17.7)
IMM GRANULOCYTES # BLD AUTO: 0.02 10*3/MM3 (ref 0–0.05)
IMM GRANULOCYTES NFR BLD AUTO: 0.3 % (ref 0–0.5)
LYMPHOCYTES # BLD AUTO: 2.41 10*3/MM3 (ref 0.7–3.1)
LYMPHOCYTES NFR BLD AUTO: 34.2 % (ref 19.6–45.3)
MCH RBC QN AUTO: 30.9 PG (ref 26.6–33)
MCHC RBC AUTO-ENTMCNC: 34.2 G/DL (ref 31.5–35.7)
MCV RBC AUTO: 90.2 FL (ref 79–97)
METHADONE UR QL SCN: NEGATIVE
MONOCYTES # BLD AUTO: 0.56 10*3/MM3 (ref 0.1–0.9)
MONOCYTES NFR BLD AUTO: 8 % (ref 5–12)
NEUTROPHILS NFR BLD AUTO: 3.91 10*3/MM3 (ref 1.7–7)
NEUTROPHILS NFR BLD AUTO: 55.5 % (ref 42.7–76)
NRBC BLD AUTO-RTO: 0 /100 WBC (ref 0–0.2)
OPIATES UR QL: NEGATIVE
OXYCODONE UR QL SCN: NEGATIVE
PCP UR QL SCN: NEGATIVE
PLATELET # BLD AUTO: 224 10*3/MM3 (ref 140–450)
PMV BLD AUTO: 9.9 FL (ref 6–12)
POTASSIUM SERPL-SCNC: 3.8 MMOL/L (ref 3.5–5.2)
PROPOXYPH UR QL: NEGATIVE
PROT SERPL-MCNC: 6.7 G/DL (ref 6–8.5)
RBC # BLD AUTO: 4.99 10*6/MM3 (ref 4.14–5.8)
SALICYLATES SERPL-MCNC: <0.3 MG/DL
SARS-COV-2 RNA RESP QL NAA+PROBE: NOT DETECTED
SODIUM SERPL-SCNC: 146 MMOL/L (ref 136–145)
TRICYCLICS UR QL SCN: NEGATIVE
WBC NRBC COR # BLD: 7.04 10*3/MM3 (ref 3.4–10.8)

## 2023-06-12 PROCEDURE — 87635 SARS-COV-2 COVID-19 AMP PRB: CPT | Performed by: EMERGENCY MEDICINE

## 2023-06-12 PROCEDURE — 36415 COLL VENOUS BLD VENIPUNCTURE: CPT

## 2023-06-12 PROCEDURE — 80143 DRUG ASSAY ACETAMINOPHEN: CPT | Performed by: EMERGENCY MEDICINE

## 2023-06-12 PROCEDURE — 99284 EMERGENCY DEPT VISIT MOD MDM: CPT

## 2023-06-12 PROCEDURE — 80306 DRUG TEST PRSMV INSTRMNT: CPT | Performed by: EMERGENCY MEDICINE

## 2023-06-12 PROCEDURE — 85025 COMPLETE CBC W/AUTO DIFF WBC: CPT | Performed by: EMERGENCY MEDICINE

## 2023-06-12 PROCEDURE — 80179 DRUG ASSAY SALICYLATE: CPT | Performed by: EMERGENCY MEDICINE

## 2023-06-12 PROCEDURE — 82077 ASSAY SPEC XCP UR&BREATH IA: CPT | Performed by: EMERGENCY MEDICINE

## 2023-06-12 PROCEDURE — 80053 COMPREHEN METABOLIC PANEL: CPT | Performed by: EMERGENCY MEDICINE

## 2023-06-12 RX ORDER — SODIUM CHLORIDE 0.9 % (FLUSH) 0.9 %
10 SYRINGE (ML) INJECTION AS NEEDED
Status: DISCONTINUED | OUTPATIENT
Start: 2023-06-12 | End: 2023-06-13 | Stop reason: HOSPADM

## 2023-06-13 VITALS
HEIGHT: 69 IN | RESPIRATION RATE: 17 BRPM | TEMPERATURE: 97.7 F | OXYGEN SATURATION: 95 % | HEART RATE: 74 BPM | BODY MASS INDEX: 22.22 KG/M2 | WEIGHT: 150 LBS | SYSTOLIC BLOOD PRESSURE: 103 MMHG | DIASTOLIC BLOOD PRESSURE: 68 MMHG

## 2023-06-13 LAB
HOLD SPECIMEN: NORMAL
WHOLE BLOOD HOLD COAG: NORMAL
WHOLE BLOOD HOLD SPECIMEN: NORMAL

## 2023-06-13 NOTE — ED NOTES
Pt reports to this RN that he drank sips of rubbing alcohol throughout day. Pt denies any other substance abuse.

## 2023-06-13 NOTE — ED NOTES
Face-to-face report given to [facility] - no further questions. Patient's personal belongings sent with EMS to take to [facility]

## 2023-06-13 NOTE — ED NOTES
[facility]ed [facility] with no answer.  Called [facility] and  spoke with Josie.  She will continue trying to reach an  to return our call.

## 2023-06-13 NOTE — ED PROVIDER NOTES
Subjective   History of Present Illness    Review of Systems    Past Medical History:   Diagnosis Date    Alcohol abuse     Hodgkin's lymphoma 2002    Migraines     PTSD (post-traumatic stress disorder)     Seizures        Allergies   Allergen Reactions    Naproxen Hives    Penicillins Hives       No past surgical history on file.    No family history on file.    Social History     Socioeconomic History    Marital status:    Tobacco Use    Smoking status: Every Day     Packs/day: 0.25     Years: 24.00     Pack years: 6.00     Types: Cigarettes    Smokeless tobacco: Never   Vaping Use    Vaping Use: Never used   Substance and Sexual Activity    Alcohol use: Yes     Alcohol/week: 22.0 standard drinks     Types: 6 Cans of beer, 16 Shots of liquor per week     Comment: Either beer OR 1/5th of bourbon    Drug use: Yes     Types: Other, Marijuana     Comment: alcohol    Sexual activity: Not Currently     Partners: Female           Objective   Physical Exam    Procedures           ED Course  ED Course as of 06/13/23 0343   Mon Jun 12, 2023   2333 The acetaminophen level is less than 5.0 normal.    Salicylate level is less than 0.3 and normal.    The CBC is unremarkable [RC]   2333 The ethanol level is less than 0.010 this is normal. [RC]   2334 Serum glucose is 101.  The sodium is 146.  The CMP is otherwise unremarkable. [RC]   2342 The urinalysis is negative. [RC]   Tue Jun 13, 2023   0305 Kedar from the [facility] has evaluated patient and reports that Sarah VAUGHAN has recommended patient for inpatient admission. He is working on finding a bed now. [TD]      ED Course User Index  [RC] Paxton Centeno MD  [TD] Alverto Jama MD                                           Medical Decision Making  Problems Addressed:  Intentional overdose, initial encounter: complicated acute illness or injury  Major depressive disorder with current active episode, unspecified depression episode severity, unspecified whether  recurrent: complicated acute illness or injury    Amount and/or Complexity of Data Reviewed  Labs: ordered.    Risk  Prescription drug management.        Final diagnoses:   Major depressive disorder with current active episode, unspecified depression episode severity, unspecified whether recurrent   Intentional overdose, initial encounter       ED Disposition  ED Disposition       ED Disposition   Transfer to Another Facility     Condition   --    Comment   --               No follow-up provider specified.       Medication List      No changes were made to your prescriptions during this visit.            Alverto Jama MD  06/13/23 0336       Alverto Jama MD  06/13/23 0343

## 2023-06-13 NOTE — ED NOTES
Called [facility] and spoke with Kedar.  He ask for all forms to be faxed and an assessment will be done within an hour.

## 2023-06-13 NOTE — ED NOTES
Phone report given to Lizzeth at [facility] - no further questions. Accepting provider is Satish Montiel

## 2023-06-13 NOTE — ED NOTES
"Nursing report ED to floor  Juan Gomes  42 y.o.  male    HPI :   Chief Complaint   Patient presents with    Suicidal       Admitting doctor:   No admitting provider for patient encounter.    Admitting diagnosis:   The primary encounter diagnosis was Major depressive disorder with current active episode, unspecified depression episode severity, unspecified whether recurrent. A diagnosis of Intentional overdose, initial encounter was also pertinent to this visit.    Code status:   Current Code Status       Date Active Code Status Order ID Comments User Context       Prior            Allergies:   Naproxen and Penicillins    Isolation:   No active isolations    Intake and Output  No intake or output data in the 24 hours ending 06/13/23 0402    Weight:       06/12/23  2221   Weight: 68 kg (150 lb)       Most recent vitals:   Vitals:    06/12/23 2221 06/13/23 0138 06/13/23 0350   BP: 108/67 112/73 103/68   BP Location: Left arm Left arm Left arm   Patient Position: Sitting Lying Lying   Pulse: 83 95 74   Resp: 16 16 17   Temp: 97.7 °F (36.5 °C)  97.7 °F (36.5 °C)   TempSrc: Oral  Oral   SpO2: 100% 95% 95%   Weight: 68 kg (150 lb)     Height: 175.3 cm (69\")         Active LDAs/IV Access:   Lines, Drains & Airways       Active LDAs       None                    Labs (abnormal labs have a star):   Labs Reviewed   COMPREHENSIVE METABOLIC PANEL - Abnormal; Notable for the following components:       Result Value    Glucose 101 (*)     Sodium 146 (*)     All other components within normal limits    Narrative:     GFR Normal >60  Chronic Kidney Disease <60  Kidney Failure <15     CBC WITH AUTO DIFFERENTIAL - Abnormal; Notable for the following components:    RDW 11.9 (*)     All other components within normal limits   COVID-19,[facility],NASAL SWAB NO TRANSPORT MEDIA 2 HR TAT - Normal    Narrative:     Fact sheet for providers: https://www.fda.gov/media/352747/download     Fact sheet for patients: " https://www.fda.gov/media/019562/download    Test performed by PCR.    Consider negative results in combination with clinical observations, patient history, and epidemiological information.   ACETAMINOPHEN LEVEL - Normal   SALICYLATE LEVEL - Normal   URINE DRUG SCREEN - Normal    Narrative:     Urine drug screen results are to be used for medical purposes only.  They are not to be used for legal purposes such as employment testing.  Negative results do not necessarily mean the complete absence of a subtance, but rather that the result is less than the cutoff for that substance.  Positive results are unconfirmed and considered Preliminary Positive.  [facility] does not automatically confirm Postitive Unconfirmed results.  The physician may request (order) an Unconfirmed Positive result to be sent out for confirmation.      Negative Thresholds for Drugs Screened:    THC screen, urine                          50 ng/ml  Phenycyclidine (PCP), urine                25 ng/ml  Cocaine screen, urine                     150 ng/ml  Methamphetamine, urine                    500 ng/ml  Opiate screen, urine                      100 ng/ml  Amphetamine screen, urine                 500 ng/ml  Benzodiazepine screen, urine              150 ng/ml  Tricyclic Antidepressants screen, urine   300 ng/ml  Methadone screen, urine                   200 ng/ml  Barbiturates screen, urine                200 ng/ml  Oxycodone screen, urine                   100 ng/ml  Propoxyphene screen, urine                300 ng/ml  Buprenorphine screen, urine                10 ng/ml   COVID PRE-OP / PRE-PROCEDURE SCREENING ORDER (NO ISOLATION)    Narrative:     The following orders were created for panel order COVID PRE-OP / PRE-PROCEDURE SCREENING ORDER (NO ISOLATION) - Swab, Nasal Cavity.  Procedure                               Abnormality         Status                     ---------                               -----------         ------                      COVID-19,[employer] IN-AGNES...[[id number]]  Normal              Final result                 Please view results for these tests on the individual orders.   RAINBOW DRAW    Narrative:     The following orders were created for panel order [facility] Draw.  Procedure                               Abnormality         Status                     ---------                               -----------         ------                     Green Top (Gel)[[id number]]                                  Final result               Lavender Top[[id number]]                                     Final result               Light Blue Top[[id number]]                                   Final result                 Please view results for these tests on the individual orders.   ETHANOL   POCT GLUCOSE FINGERSTICK   CBC AND DIFFERENTIAL    Narrative:     The following orders were created for panel order CBC & Differential.  Procedure                               Abnormality         Status                     ---------                               -----------         ------                     CBC Auto Differential[[id number]]        Abnormal            Final result                 Please view results for these tests on the individual orders.   GREEN TOP   LAVENDER TOP   LIGHT BLUE TOP       EKG:   No orders to display       Meds given in ED:   Medications   sodium chloride 0.9 % flush 10 mL (has no administration in time range)       Imaging results:  No radiology results for the last day    Ambulatory status:   - up ad tian    Social issues:   Social History     Socioeconomic History    Marital status:    Tobacco Use    Smoking status: Every Day     Packs/day: 0.25     Years: 24.00     Pack years: 6.00     Types: Cigarettes    Smokeless tobacco: Never   Vaping Use    Vaping Use: Never used   Substance and Sexual Activity    Alcohol use: Yes     Alcohol/week: 22.0 standard drinks     Types: 6 Cans of beer, 16 Shots of liquor per  week     Comment: Either beer OR 1/5th of bourbon    Drug use: Yes     Types: Other, Marijuana     Comment: alcohol    Sexual activity: Not Currently     Partners: Female       NIH Stroke Scale:         Isabel Collins RN  06/13/23 04:02 EDT

## 2023-06-13 NOTE — ED PROVIDER NOTES
Medical Week 2 Survey      Responses   [facility] patient discharged from?  [place]   Does the patient have one of the following disease processes/diagnoses(primary or secondary)?  Other   Week 2 attempt successful?  Yes   Call start time  1135   Discharge diagnosis  Left pleural effusion, abdominal ascites, shortness of breath   Rescheduled  Rescheduled-pt requested   Person spoke with today (if not patient) and relationship  [place], daughter          Yvonne Pfeiffer RN   Subjective   History of Present Illness  History of Present Illness    Chief complaint: Alcohol ingestion    Location: Home    Quality/Severity: Sipping on isopropyl alcohol    Timing/Onset/Duration: All day    Modifying Factors: Nothing makes it better or worse    Associated Symptoms: No headache.  No fever chills or cough.  No sore throat earache or nasal congestion.  No chest pain or shortness of breath.  No abdominal pain.  No nausea or vomiting.  No diarrhea or burning on urination.    Narrative: This 42-year-old white male has been sipping on isopropyl alcohol all day in an attempt to kill himself.  This is the anniversary of his wife's death from alcohol.  The patient denies any hallucinations or homicidal ideation.  The patient has not had any alcohol for 9 months.    PCP:Tanna Garza APRN  Review of Systems   Constitutional: Negative for chills and fever.   HENT: Negative for congestion, ear pain and sore throat.    Respiratory: Negative for cough and shortness of breath.    Cardiovascular: Negative for chest pain.   Gastrointestinal: Negative for diarrhea, nausea and vomiting.   Genitourinary: Negative for difficulty urinating.   Musculoskeletal: Negative for back pain.   Skin: Negative for rash.   Neurological: Negative for headaches.        Medication List      ASK your doctor about these medications    cetirizine 10 MG tablet  Commonly known as: zyrTEC  Take 1 tablet by mouth Daily. Indications: Hayfever     Melatonin Maximum Strength 5 MG tablet tablet  Generic drug: melatonin  Take 1 tablet by mouth Every Night. Indications: Trouble Sleeping     metoprolol tartrate 25 MG tablet  Commonly known as: LOPRESSOR  Take 1 tablet by mouth Every 12 (Twelve) Hours. Indications: High Blood Pressure Disorder     mirtazapine 15 MG tablet  Commonly known as: REMERON  Take 1 tablet by mouth Every Night. Indications: Major Depressive Disorder     omeprazole 20 MG capsule  Commonly known as: priLOSEC  Take 1 capsule  by mouth Daily. Indications: Heartburn            Past Medical History:   Diagnosis Date   • Alcohol abuse    • Hodgkin's lymphoma 2002   • Migraines    • PTSD (post-traumatic stress disorder)    • Seizures        Allergies   Allergen Reactions   • Naproxen Hives   • Penicillins Hives       No past surgical history on file.    No family history on file.    Social History     Socioeconomic History   • Marital status:    Tobacco Use   • Smoking status: Every Day     Packs/day: 0.25     Years: 24.00     Pack years: 6.00     Types: Cigarettes   • Smokeless tobacco: Never   Vaping Use   • Vaping Use: Never used   Substance and Sexual Activity   • Alcohol use: Yes     Alcohol/week: 22.0 standard drinks     Types: 6 Cans of beer, 16 Shots of liquor per week     Comment: Either beer OR 1/5th of bourbon   • Drug use: Yes     Types: Other, Marijuana     Comment: alcohol   • Sexual activity: Not Currently     Partners: Female           Objective   Physical Exam  Vitals (The temperature is 97.7 °F, pulse 83, respirations 16, /67, room air pulse ox 100%.) and nursing note reviewed.   Constitutional:       Appearance: Normal appearance.   HENT:      Head: Normocephalic and atraumatic.      Right Ear: Tympanic membrane normal.      Left Ear: Tympanic membrane normal.      Nose: Nose normal.      Mouth/Throat:      Mouth: Mucous membranes are moist.   Eyes:      Extraocular Movements: Extraocular movements intact.      Pupils: Pupils are equal, round, and reactive to light.   Cardiovascular:      Rate and Rhythm: Normal rate and regular rhythm.      Pulses: Normal pulses.      Heart sounds: No murmur heard.    No friction rub. No gallop.   Pulmonary:      Effort: Pulmonary effort is normal.      Breath sounds: Normal breath sounds.   Abdominal:      General: Abdomen is flat. Bowel sounds are normal. There is no distension.      Palpations: Abdomen is soft. There is no mass.      Tenderness: There is no abdominal  tenderness. There is no guarding or rebound.      Hernia: No hernia is present.   Musculoskeletal:         General: Normal range of motion.      Cervical back: Normal range of motion and neck supple.   Skin:     General: Skin is warm and dry.      Capillary Refill: Capillary refill takes less than 2 seconds.      Findings: No rash.   Neurological:      General: No focal deficit present.      Mental Status: He is alert and oriented to person, place, and time.      Cranial Nerves: No cranial nerve deficit.      Sensory: No sensory deficit.      Motor: No weakness.         Procedures           ED Course  ED Course as of 06/14/23 0144   Mon Jun 12, 2023 2333 The acetaminophen level is less than 5.0 normal.    Salicylate level is less than 0.3 and normal.    The CBC is unremarkable [RC]   2333 The ethanol level is less than 0.010 this is normal. [RC]   2334 Serum glucose is 101.  The sodium is 146.  The CMP is otherwise unremarkable. [RC]   2342 The urinalysis is negative. [RC]   Tue Jun 13, 2023   0305 Kedar from the [facility] has evaluated patient and reports that Sarah VAUGHAN has recommended patient for inpatient admission. He is working on finding a bed now. [TD]      ED Course User Index  [RC] Paxton Centeno MD  [TD] Alverto Jama MD      23:43 EDT, 06/12/23:  The patient's diagnosis of isopropyl overdose was discussed with him.  The time team has been consulted.  We are awaiting their consult.  The patient denies any hallucinations or homicidal ideation.  He is currently on a 72-hour hold.  The plan will be to have high time team to evaluate him for possible admission to a psychiatric facility.  The patient is in stable condition.  The provisional diagnosis is depression and isopropyl alcohol overdose.  The patient will be turned over to Dr. RHEA Allen stable condition                                     MDM    Final diagnoses:   Major depressive disorder with current active episode, unspecified depression  episode severity, unspecified whether recurrent   Intentional overdose, initial encounter       ED Disposition  ED Disposition     None          No follow-up provider specified.       Medication List      No changes were made to your prescriptions during this visit.          Paxton Centeno MD  06/12/23 5211       Paxton Centeno MD  06/14/23 6797

## 2024-03-26 ENCOUNTER — OFFICE VISIT (OUTPATIENT)
Dept: NEUROLOGY | Facility: CLINIC | Age: 44
End: 2024-03-26
Payer: MEDICAID

## 2024-03-26 VITALS
OXYGEN SATURATION: 99 % | HEIGHT: 69 IN | SYSTOLIC BLOOD PRESSURE: 122 MMHG | DIASTOLIC BLOOD PRESSURE: 80 MMHG | BODY MASS INDEX: 21.45 KG/M2 | HEART RATE: 97 BPM | WEIGHT: 144.8 LBS

## 2024-03-26 DIAGNOSIS — R56.9 PROVOKED SEIZURES: Primary | ICD-10-CM

## 2024-03-26 PROCEDURE — 1159F MED LIST DOCD IN RCRD: CPT | Performed by: PSYCHIATRY & NEUROLOGY

## 2024-03-26 PROCEDURE — 1160F RVW MEDS BY RX/DR IN RCRD: CPT | Performed by: PSYCHIATRY & NEUROLOGY

## 2024-03-26 PROCEDURE — 99204 OFFICE O/P NEW MOD 45 MIN: CPT | Performed by: PSYCHIATRY & NEUROLOGY

## 2024-03-26 RX ORDER — DIAZEPAM 5 MG/1
TABLET ORAL
Qty: 2 TABLET | Refills: 0 | Status: SHIPPED | OUTPATIENT
Start: 2024-03-26

## 2024-03-26 NOTE — PROGRESS NOTES
Notes by LPN:  Patient referred for seizures. Patient states he was having seizures when he was trying to quit drinking. He says he needs clearance to get his license. Patient states last seizure was late 2022.           Subjective:     Dear Tanna, I had the pleasure of seeing Mr. Gomes in neurological consultation today.  As you know, he is a 43-year-old right-handed gentleman sent for evaluation of a history of seizures.  His seizures seem to be provoked by substance abuse and/or alcohol withdrawal.  He does not have any childhood history of seizures and there is no family history of seizures.  His last seizure was about 18 months ago and was associated with alcohol withdrawal per his report.  His last drink of alcohol was 6 to 8 months ago.  He remains seizure-free and doing well.  Driving restrictions have been lifted.  He is referred to neurology for an further evaluation if necessary.  Never been on anticonvulsants.  Patient ID: Juan Gomes is a 43 y.o. male.    Seizures   Pertinent negatives include no confusion, no headaches, no speech difficulty, no visual disturbance, no chest pain, no nausea and no vomiting.     The following portions of the patient's history were reviewed and updated as appropriate: allergies, current medications, past family history, past medical history, past social history, past surgical history, and problem list.    Review of Systems   Constitutional:  Negative for activity change, appetite change and fatigue.   HENT:  Negative for facial swelling, trouble swallowing and voice change.    Eyes:  Negative for photophobia, pain and visual disturbance.   Respiratory:  Negative for chest tightness, shortness of breath and wheezing.    Cardiovascular:  Negative for chest pain, palpitations and leg swelling.   Gastrointestinal:  Negative for abdominal pain, nausea and vomiting.   Endocrine: Negative for cold intolerance and heat intolerance.   Musculoskeletal:  Negative for arthralgias,  back pain, gait problem, joint swelling, myalgias, neck pain and neck stiffness.   Neurological:  Negative for dizziness, tremors, seizures, syncope, facial asymmetry, speech difficulty, weakness, light-headedness, numbness and headaches.   Hematological:  Does not bruise/bleed easily.   Psychiatric/Behavioral:  Positive for sleep disturbance. Negative for agitation, behavioral problems, confusion, decreased concentration, dysphoric mood, hallucinations, self-injury and suicidal ideas. The patient is not nervous/anxious and is not hyperactive.         Objective:    Neurologic Exam  Awake alert pleasant cooperative oriented in all spheres with fluent speech and normal speech comprehension.    Cranial nerves II through XII normal and symmetric.    Motor exam reveals normal symmetric tone bulk and power throughout without drift or spasticity.  No adventitious movements.    The sensory exam reveals normal and symmetric sensation to light touch, temperature, vibration throughout.    Coordination testing reveals smooth and accurate finger-nose-finger bilaterally, normal heel-to-shin bilaterally and normal rhythmic rapid alternating movements.  Romberg testing is negative.    Tendon reflexes are 2+ and symmetric throughout including preserved ankle jerks bilaterally.  No ankle clonus.    Physical Exam    Assessment/Plan:     Diagnoses and all orders for this visit:    1. Provoked seizures (Primary)  -     MRI Brain With & Without Contrast; Future  -     EEG (Hospital Performed); Future  -     diazePAM (VALIUM) 5 MG tablet; Take one 30 min before MRI, repeat at time of MRI if needed.  Dispense: 2 tablet; Refill: 0     43-year-old gentleman with a history of alcohol related seizures.  These were presumably provoked seizures from alcohol withdrawal.  However, he has not had any further epilepsy workup.  We will move ahead with an epilepsy protocol MRI of the brain as well as a 60-minute EEG and review the results and take any  further measures that may be necessary.  Fortunately, he has been seizure-free for about 18 months now and has been off of alcohol for 6 to 8 months.  I will review his workup as it evolves and take any further measures that may be necessary.  If his workup is normal, no further neurological intervention will be necessary.  I discussed all of this with him and answered his questions.  Thank you very much for the opportunity to participate in the care of this very pleasant gentleman.

## 2024-04-05 ENCOUNTER — PATIENT ROUNDING (BHMG ONLY) (OUTPATIENT)
Dept: NEUROLOGY | Facility: CLINIC | Age: 44
End: 2024-04-05
Payer: MEDICAID

## 2024-04-11 ENCOUNTER — HOSPITAL ENCOUNTER (OUTPATIENT)
Dept: GENERAL RADIOLOGY | Facility: HOSPITAL | Age: 44
Discharge: HOME | End: 2024-04-11
Payer: MEDICAID

## 2024-04-11 ENCOUNTER — TRANSCRIBE ORDERS (OUTPATIENT)
Dept: ADMINISTRATIVE | Facility: HOSPITAL | Age: 44
End: 2024-04-11
Payer: MEDICAID

## 2024-04-11 DIAGNOSIS — R63.4 ABNORMAL WEIGHT LOSS: Primary | ICD-10-CM

## 2024-04-11 DIAGNOSIS — R63.4 ABNORMAL WEIGHT LOSS: ICD-10-CM

## 2024-04-11 PROCEDURE — 71046 X-RAY EXAM CHEST 2 VIEWS: CPT

## 2024-05-02 ENCOUNTER — HOSPITAL ENCOUNTER (OUTPATIENT)
Dept: MRI IMAGING | Facility: HOSPITAL | Age: 44
Discharge: HOME | End: 2024-05-02
Admitting: PSYCHIATRY & NEUROLOGY
Payer: MEDICAID

## 2024-05-02 ENCOUNTER — APPOINTMENT (OUTPATIENT)
Dept: PULMONOLOGY | Facility: HOSPITAL | Age: 44
End: 2024-05-02
Payer: MEDICAID

## 2024-05-02 DIAGNOSIS — R56.9 PROVOKED SEIZURES: ICD-10-CM

## 2024-05-02 LAB — CREAT BLDA-MCNC: 0.7 MG/DL (ref 0.6–1.3)

## 2024-05-02 PROCEDURE — 70553 MRI BRAIN STEM W/O & W/DYE: CPT

## 2024-05-02 PROCEDURE — 0 GADOBENATE DIMEGLUMINE 529 MG/ML SOLUTION: Performed by: PSYCHIATRY & NEUROLOGY

## 2024-05-02 PROCEDURE — 82565 ASSAY OF CREATININE: CPT

## 2024-05-02 PROCEDURE — A9577 INJ MULTIHANCE: HCPCS | Performed by: PSYCHIATRY & NEUROLOGY

## 2024-05-02 RX ADMIN — GADOBENATE DIMEGLUMINE 12 ML: 529 INJECTION, SOLUTION INTRAVENOUS at 09:21

## 2024-05-08 ENCOUNTER — HOSPITAL ENCOUNTER (OUTPATIENT)
Dept: SLEEP MEDICINE | Facility: HOSPITAL | Age: 44
Discharge: HOME | End: 2024-05-08
Admitting: PSYCHIATRY & NEUROLOGY
Payer: MEDICAID

## 2024-05-08 DIAGNOSIS — R56.9 PROVOKED SEIZURES: ICD-10-CM

## 2024-05-08 PROCEDURE — 95813 EEG EXTND MNTR 61-119 MIN: CPT

## 2024-05-09 ENCOUNTER — TELEPHONE (OUTPATIENT)
Dept: NEUROLOGY | Facility: CLINIC | Age: 44
End: 2024-05-09
Payer: MEDICAID

## 2024-05-10 ENCOUNTER — TELEPHONE (OUTPATIENT)
Dept: NEUROLOGY | Facility: CLINIC | Age: 44
End: 2024-05-10
Payer: MEDICAID

## 2024-09-03 PROBLEM — R05.1 ACUTE COUGH: Status: ACTIVE | Noted: 2024-09-03

## 2024-09-16 ENCOUNTER — TRANSCRIBE ORDERS (OUTPATIENT)
Dept: CT IMAGING | Facility: HOSPITAL | Age: 44
End: 2024-09-16
Payer: MEDICAID

## 2024-09-16 DIAGNOSIS — R63.4 UNINTENTIONAL WEIGHT LOSS: Primary | ICD-10-CM

## 2024-09-17 ENCOUNTER — HOSPITAL ENCOUNTER (OUTPATIENT)
Dept: GENERAL RADIOLOGY | Facility: HOSPITAL | Age: 44
Discharge: HOME | End: 2024-09-17
Admitting: NURSE PRACTITIONER
Payer: MEDICAID

## 2024-09-17 ENCOUNTER — TRANSCRIBE ORDERS (OUTPATIENT)
Dept: ADMINISTRATIVE | Facility: HOSPITAL | Age: 44
End: 2024-09-17
Payer: MEDICAID

## 2024-09-17 DIAGNOSIS — R05.3 CHRONIC COUGH: ICD-10-CM

## 2024-09-17 DIAGNOSIS — R05.3 CHRONIC COUGH: Primary | ICD-10-CM

## 2024-09-17 PROCEDURE — 71046 X-RAY EXAM CHEST 2 VIEWS: CPT

## 2024-09-25 ENCOUNTER — HOSPITAL ENCOUNTER (OUTPATIENT)
Dept: CT IMAGING | Facility: HOSPITAL | Age: 44
Discharge: HOME | End: 2024-09-25
Admitting: NURSE PRACTITIONER
Payer: MEDICAID

## 2024-09-25 ENCOUNTER — OFFICE VISIT (OUTPATIENT)
Dept: GASTROENTEROLOGY | Facility: CLINIC | Age: 44
End: 2024-09-25
Payer: MEDICAID

## 2024-09-25 ENCOUNTER — PREP FOR SURGERY (OUTPATIENT)
Dept: SURGERY | Facility: SURGERY CENTER | Age: 44
End: 2024-09-25
Payer: MEDICAID

## 2024-09-25 ENCOUNTER — TELEPHONE (OUTPATIENT)
Dept: GASTROENTEROLOGY | Facility: CLINIC | Age: 44
End: 2024-09-25

## 2024-09-25 VITALS
HEIGHT: 69 IN | BODY MASS INDEX: 20.32 KG/M2 | WEIGHT: 137.2 LBS | DIASTOLIC BLOOD PRESSURE: 80 MMHG | SYSTOLIC BLOOD PRESSURE: 120 MMHG

## 2024-09-25 DIAGNOSIS — F12.10 TETRAHYDROCANNABINOL (THC) USE DISORDER, MILD, ABUSE: ICD-10-CM

## 2024-09-25 DIAGNOSIS — R63.4 ABNORMAL WEIGHT LOSS: ICD-10-CM

## 2024-09-25 DIAGNOSIS — R63.4 UNINTENTIONAL WEIGHT LOSS: ICD-10-CM

## 2024-09-25 DIAGNOSIS — Z85.71 HISTORY OF HODGKIN'S LYMPHOMA: ICD-10-CM

## 2024-09-25 DIAGNOSIS — R68.81 EARLY SATIETY: ICD-10-CM

## 2024-09-25 DIAGNOSIS — R11.0 NAUSEA: ICD-10-CM

## 2024-09-25 DIAGNOSIS — R10.13 EPIGASTRIC PAIN: Primary | ICD-10-CM

## 2024-09-25 PROCEDURE — 74176 CT ABD & PELVIS W/O CONTRAST: CPT

## 2024-09-25 RX ORDER — SODIUM CHLORIDE 0.9 % (FLUSH) 0.9 %
3 SYRINGE (ML) INJECTION EVERY 12 HOURS SCHEDULED
OUTPATIENT
Start: 2024-09-25

## 2024-09-25 RX ORDER — SODIUM CHLORIDE 0.9 % (FLUSH) 0.9 %
10 SYRINGE (ML) INJECTION AS NEEDED
OUTPATIENT
Start: 2024-09-25

## 2024-09-25 RX ORDER — SODIUM CHLORIDE, SODIUM LACTATE, POTASSIUM CHLORIDE, CALCIUM CHLORIDE 600; 310; 30; 20 MG/100ML; MG/100ML; MG/100ML; MG/100ML
30 INJECTION, SOLUTION INTRAVENOUS CONTINUOUS PRN
OUTPATIENT
Start: 2024-09-25

## 2024-09-25 RX ORDER — ASPIRIN 81 MG/1
81 TABLET ORAL DAILY
COMMUNITY

## 2024-09-25 RX ORDER — MIRTAZAPINE 15 MG/1
1 TABLET, FILM COATED ORAL NIGHTLY
COMMUNITY
Start: 2024-09-16

## 2024-09-25 NOTE — TELEPHONE ENCOUNTER
Provider: DR. NYE    Caller: AURELIO PASCUAL    Relationship to Patient: SELF    Phone Number: [phone]    Reason for Call: PT CALLED TO RESCHEDULE 10/24/2024 PROCEDURE APPT// PLEASE CALL PT BACK

## 2024-11-04 ENCOUNTER — ANESTHESIA EVENT (OUTPATIENT)
Dept: PERIOP | Facility: HOSPITAL | Age: 44
End: 2024-11-04
Payer: MEDICAID

## 2024-11-05 RX ORDER — MULTIPLE VITAMINS W/ MINERALS TAB 9MG-400MCG
1 TAB ORAL DAILY
COMMUNITY

## 2024-11-06 ENCOUNTER — ANESTHESIA (OUTPATIENT)
Dept: PERIOP | Facility: HOSPITAL | Age: 44
End: 2024-11-06
Payer: MEDICAID

## 2024-11-06 ENCOUNTER — HOSPITAL ENCOUNTER (OUTPATIENT)
Facility: HOSPITAL | Age: 44
Setting detail: HOSPITAL OUTPATIENT SURGERY
Discharge: HOME | End: 2024-11-06
Attending: STUDENT IN AN ORGANIZED HEALTH CARE EDUCATION/TRAINING PROGRAM | Admitting: STUDENT IN AN ORGANIZED HEALTH CARE EDUCATION/TRAINING PROGRAM
Payer: MEDICAID

## 2024-11-06 VITALS
HEART RATE: 94 BPM | SYSTOLIC BLOOD PRESSURE: 112 MMHG | HEIGHT: 69 IN | OXYGEN SATURATION: 98 % | BODY MASS INDEX: 20.41 KG/M2 | DIASTOLIC BLOOD PRESSURE: 77 MMHG | TEMPERATURE: 97.9 F | RESPIRATION RATE: 19 BRPM | WEIGHT: 137.8 LBS

## 2024-11-06 DIAGNOSIS — R10.13 EPIGASTRIC PAIN: ICD-10-CM

## 2024-11-06 DIAGNOSIS — R63.4 UNINTENTIONAL WEIGHT LOSS: ICD-10-CM

## 2024-11-06 DIAGNOSIS — R63.4 ABNORMAL WEIGHT LOSS: ICD-10-CM

## 2024-11-06 DIAGNOSIS — R68.81 EARLY SATIETY: ICD-10-CM

## 2024-11-06 DIAGNOSIS — R11.0 NAUSEA: ICD-10-CM

## 2024-11-06 DIAGNOSIS — Z85.71 HISTORY OF HODGKIN'S LYMPHOMA: ICD-10-CM

## 2024-11-06 PROCEDURE — 88305 TISSUE EXAM BY PATHOLOGIST: CPT | Performed by: STUDENT IN AN ORGANIZED HEALTH CARE EDUCATION/TRAINING PROGRAM

## 2024-11-06 PROCEDURE — 25010000002 PROPOFOL 200 MG/20ML EMULSION: Performed by: NURSE ANESTHETIST, CERTIFIED REGISTERED

## 2024-11-06 RX ORDER — SODIUM CHLORIDE, SODIUM LACTATE, POTASSIUM CHLORIDE, CALCIUM CHLORIDE 600; 310; 30; 20 MG/100ML; MG/100ML; MG/100ML; MG/100ML
30 INJECTION, SOLUTION INTRAVENOUS CONTINUOUS PRN
Status: DISCONTINUED | OUTPATIENT
Start: 2024-11-06 | End: 2024-11-06 | Stop reason: HOSPADM

## 2024-11-06 RX ORDER — SODIUM CHLORIDE 0.9 % (FLUSH) 0.9 %
10 SYRINGE (ML) INJECTION AS NEEDED
Status: DISCONTINUED | OUTPATIENT
Start: 2024-11-06 | End: 2024-11-06 | Stop reason: HOSPADM

## 2024-11-06 RX ORDER — SODIUM CHLORIDE 0.9 % (FLUSH) 0.9 %
3 SYRINGE (ML) INJECTION EVERY 12 HOURS SCHEDULED
Status: DISCONTINUED | OUTPATIENT
Start: 2024-11-06 | End: 2024-11-06 | Stop reason: HOSPADM

## 2024-11-06 RX ORDER — ONDANSETRON 2 MG/ML
4 INJECTION INTRAMUSCULAR; INTRAVENOUS ONCE AS NEEDED
Status: DISCONTINUED | OUTPATIENT
Start: 2024-11-06 | End: 2024-11-06 | Stop reason: HOSPADM

## 2024-11-06 RX ORDER — SODIUM CHLORIDE, SODIUM LACTATE, POTASSIUM CHLORIDE, CALCIUM CHLORIDE 600; 310; 30; 20 MG/100ML; MG/100ML; MG/100ML; MG/100ML
9 INJECTION, SOLUTION INTRAVENOUS CONTINUOUS
Status: DISCONTINUED | OUTPATIENT
Start: 2024-11-06 | End: 2024-11-06 | Stop reason: HOSPADM

## 2024-11-06 RX ORDER — OMEPRAZOLE 40 MG/1
40 CAPSULE, DELAYED RELEASE ORAL
Qty: 90 CAPSULE | Refills: 3 | Status: SHIPPED | OUTPATIENT
Start: 2024-11-06

## 2024-11-06 RX ORDER — PROPOFOL 10 MG/ML
INJECTION, EMULSION INTRAVENOUS AS NEEDED
Status: DISCONTINUED | OUTPATIENT
Start: 2024-11-06 | End: 2024-11-06 | Stop reason: SURG

## 2024-11-06 RX ORDER — LIDOCAINE HYDROCHLORIDE 10 MG/ML
0.5 INJECTION, SOLUTION EPIDURAL; INFILTRATION; INTRACAUDAL; PERINEURAL ONCE AS NEEDED
Status: DISCONTINUED | OUTPATIENT
Start: 2024-11-06 | End: 2024-11-06 | Stop reason: HOSPADM

## 2024-11-06 RX ADMIN — PROPOFOL 100 MG: 10 INJECTION, EMULSION INTRAVENOUS at 14:33

## 2024-11-06 RX ADMIN — PROPOFOL 100 MG: 10 INJECTION, EMULSION INTRAVENOUS at 14:35

## 2024-11-06 RX ADMIN — PROPOFOL 80 MG: 10 INJECTION, EMULSION INTRAVENOUS at 14:27

## 2024-11-06 RX ADMIN — PROPOFOL 50 MG: 10 INJECTION, EMULSION INTRAVENOUS at 14:50

## 2024-11-06 RX ADMIN — PROPOFOL 50 MG: 10 INJECTION, EMULSION INTRAVENOUS at 14:42

## 2024-11-06 RX ADMIN — PROPOFOL 50 MG: 10 INJECTION, EMULSION INTRAVENOUS at 14:45

## 2024-11-06 RX ADMIN — PROPOFOL 100 MG: 10 INJECTION, EMULSION INTRAVENOUS at 14:31

## 2024-11-06 RX ADMIN — PROPOFOL 50 MG: 10 INJECTION, EMULSION INTRAVENOUS at 14:40

## 2024-11-06 RX ADMIN — PROPOFOL 50 MG: 10 INJECTION, EMULSION INTRAVENOUS at 14:38

## 2024-11-06 RX ADMIN — PROPOFOL 20 MG: 10 INJECTION, EMULSION INTRAVENOUS at 14:29

## 2024-11-06 NOTE — ANESTHESIA POSTPROCEDURE EVALUATION
Patient: Juan Gomes    Procedure Summary       Date: 11/06/24 Room / Location: [facility] ENDOSCOPY 2 /  [facility]    Anesthesia Start: 1422 Anesthesia Stop: 1459    Procedures:       ESOPHAGOGASTRODUODENOSCOPY WITH BIOPSY      COLONOSCOPY FOR SCREENING Diagnosis:       Epigastric pain      Nausea      Early satiety      Unintentional weight loss      Abnormal weight loss      History of Hodgkin's lymphoma      (Epigastric pain [R10.13])      (Nausea [R11.0])      (Early satiety [R68.81])      (Unintentional weight loss [R63.4])      (Abnormal weight loss [R63.4])      (History of Hodgkin's lymphoma [Z85.71])    Surgeons: Kai Zaragoza MD Provider: Johana Bolanos CRNA    Anesthesia Type: MAC ASA Status: 3            Anesthesia Type: MAC    Vitals  Vitals Value Taken Time   BP 76/43 11/06/24 1530   Temp 97.9 °F (36.6 °C) 11/06/24 1500   Pulse 95 11/06/24 1536   Resp 19 11/06/24 1520   SpO2 97 % 11/06/24 1534   Vitals shown include unfiled device data.        Post Anesthesia Care and Evaluation    Patient location during evaluation: PHASE II  Patient participation: complete - patient participated  Level of consciousness: awake and alert  Pain score: 0  Pain management: adequate    Airway patency: patent  Anesthetic complications: No anesthetic complications  PONV Status: none  Cardiovascular status: acceptable  Respiratory status: acceptable  Hydration status: acceptable

## 2024-11-06 NOTE — H&P
Patient Care Team:  Tanna Garza APRN as PCP - General (Family Medicine)    CHIEF COMPLAINT:  unintentional weight loss    HISTORY OF PRESENT ILLNESS:  EGD for unintentional weight loss and dyspepsia.   C/s for unintentional weight loss.     Past Medical History:   Diagnosis Date    Alcohol abuse     Alcoholism     GERD (gastroesophageal reflux disease)     Hodgkin's lymphoma 2002    Hyperlipidemia     Hypertension     Migraines     PTSD (post-traumatic stress disorder)     Seizures      Past Surgical History:   Procedure Laterality Date    LYMPHADENECTOMY      TONSILLECTOMY       History reviewed. No pertinent family history.  Social History     Tobacco Use    Smoking status: Former     Current packs/day: 0.25     Average packs/day: 0.3 packs/day for 24.0 years (6.0 ttl pk-yrs)     Types: Cigarettes     Passive exposure: Current    Smokeless tobacco: Never   Vaping Use    Vaping status: Every Day    Substances: Nicotine    Devices: Pre-filled pod   Substance Use Topics    Alcohol use: Not Currently     Alcohol/week: 22.0 standard drinks of alcohol     Types: 6 Cans of beer, 16 Shots of liquor per week     Comment: no ETOH  2 yrs    Drug use: Yes     Types: Marijuana     Comment: on occ   last dose few days ago     Medications Prior to Admission   Medication Sig Dispense Refill Last Dose/Taking    atorvastatin (LIPITOR) 10 MG tablet Take 1 tablet by mouth Daily.   11/4/2024    cetirizine (ZyrTEC Allergy) 10 MG tablet Zyrtec 10 mg tablet   Take 1 tablet every day by oral route as needed.   11/5/2024    metoprolol succinate XL (TOPROL-XL) 25 MG 24 hr tablet Take 1 tablet by mouth 2 (Two) Times a Day.   11/4/2024    mirtazapine (REMERON) 15 MG tablet Take 1 tablet by mouth Every Night.   11/4/2024    omeprazole (priLOSEC) 20 MG capsule Take 1 capsule by mouth Daily. Indications: Heartburn 30 capsule 0 11/5/2024    sertraline (ZOLOFT) 50 MG tablet Take 1 tablet every day by oral route.   11/5/2024    aspirin 81 MG EC  "tablet Take 1 tablet by mouth Daily.   10/30/2024    multivitamin with minerals tablet tablet Take 1 tablet by mouth Daily.        Allergies:  Naproxen, Levofloxacin, Penicillins, and Sulfamethoxazole-trimethoprim    REVIEW OF SYSTEMS:  Please see the above history of present illness for pertinent positives and negatives.  The remainder of the patient's systems have been reviewed and are negative.     Vital Signs  Temp:  [98.4 °F (36.9 °C)] 98.4 °F (36.9 °C)  Heart Rate:  [96] 96  Resp:  [16] 16  BP: (130)/(95) 130/95    Flowsheet Rows      Flowsheet Row First Filed Value   Admission Height 175.3 cm (69\") Documented at 11/06/2024 1358   Admission Weight 62.5 kg (137 lb 12.8 oz) Documented at 11/06/2024 1358             Physical Exam:  Physical Exam   Constitutional: Patient appears well-developed and well-nourished and in no acute distress   HEENT:   Head: Normocephalic and atraumatic.   Eyes:  Pupils are equal, round, and reactive to light. EOM are intact. Sclerae are anicteric and non-injected.  Mouth and Throat: Patient has moist mucous membranes. Oropharynx is clear of any erythema or exudate.     Neck: Neck supple. No JVD present. No thyromegaly present. No lymphadenopathy present.  Cardiovascular: Regular rate, regular rhythm, S1 normal and S2 normal.  Exam reveals no gallop and no friction rub.  No murmur heard.  Pulmonary/Chest: Lungs are clear to auscultation bilaterally. No respiratory distress. No wheezes. No rhonchi. No rales.   Abdominal: Soft. Bowel sounds are normal. No distension and no mass. There is no hepatosplenomegaly. There is no tenderness.   Musculoskeletal: Normal Muscle tone  Extremities: No edema. Pulses are palpable in all 4 extremities.  Neurological: Patient is alert and oriented to person, place, and time. Cranial nerves II-XII are grossly intact with no focal deficits.  Skin: Skin is warm. No rash noted. Nails show no clubbing.  No cyanosis or erythema.    Debilities/Disabilities " Identified: None  Emotional Behavior: Appropriate     Results Review:   I reviewed the patient's new clinical results.    Lab Results (most recent)       None            Imaging Results (Most Recent)       None          reviewed    ECG/EMG Results (most recent)       None          reviewed    Assessment & Plan   Unintentional weight loss /  EGD and colonoscopy      I discussed the patient's findings and my recommendations with patient.     Kai Zaragoza MD  11/06/24  14:27 EST    Time: 10 min prior to procedure.

## 2024-11-06 NOTE — ANESTHESIA PREPROCEDURE EVALUATION
Anesthesia Evaluation     no history of anesthetic complications:   NPO Solid Status: > 8 hours  NPO Liquid Status: > 2 hours           Airway   Mallampati: I  TM distance: >3 FB  Neck ROM: full  No difficulty expected  Dental - normal exam     Pulmonary - normal exam   (+) a smoker (E cigarettes) Current, Smoked day of surgery, asthma (Albuterol with illness),  Cardiovascular - normal exam    (+) hypertension, hyperlipidemia      Neuro/Psych  (+) seizures (with etoh withdrawl), headaches (occ), psychiatric history Depression and PTSD  GI/Hepatic/Renal/Endo    (+) GERD well controlledDiabetes: prediabetic.    Musculoskeletal     Abdominal    Substance History   (+) drug use (Marijuana occ)  (-) alcohol use     OB/GYN          Other      history of cancer remission    (-) arthritis  ROS/Med Hx Other: Water finished at 0930                    Anesthesia Plan    ASA 3     MAC     intravenous induction     Anesthetic plan, risks, benefits, and alternatives have been provided, discussed and informed consent has been obtained with: patient.  Pre-procedure education provided  Use of blood products discussed with patient  Consented to blood products.    Plan discussed with CRNA.        CODE STATUS:

## 2024-11-08 LAB
CYTO UR: NORMAL
LAB AP CASE REPORT: NORMAL
PATH REPORT.FINAL DX SPEC: NORMAL
PATH REPORT.GROSS SPEC: NORMAL

## (undated) DEVICE — BW-412T DISP COMBO CLEANING BRUSH: Brand: SINGLE USE COMBINATION CLEANING BRUSH

## (undated) DEVICE — KT ORCA ORCAPOD DISP STRL

## (undated) DEVICE — LINER SURG CANSTR SXN S/RIGD 1500CC

## (undated) DEVICE — THE BITE BLOCK MAXI, LATEX FREE STRAP IS USED TO PROTECT THE ENDOSCOPE INSERTION TUBE FROM BEING BITTEN BY THE PATIENT.

## (undated) DEVICE — SOL IRR H2O BTL 1000ML STRL

## (undated) DEVICE — ADAPT CLN BIOGUARD AIR/H2O DISP

## (undated) DEVICE — Device

## (undated) DEVICE — VIAL FORMALIN CAP 10P 40ML

## (undated) DEVICE — SYR LL W/SCALE/MARK 3ML STRL

## (undated) DEVICE — FRCP BX RADJAW4 NDL 2.8 240CM LG OG BX40